# Patient Record
Sex: MALE | Race: BLACK OR AFRICAN AMERICAN | Employment: PART TIME | URBAN - METROPOLITAN AREA
[De-identification: names, ages, dates, MRNs, and addresses within clinical notes are randomized per-mention and may not be internally consistent; named-entity substitution may affect disease eponyms.]

---

## 2017-01-04 ENCOUNTER — OFFICE VISIT (OUTPATIENT)
Dept: FAMILY MEDICINE CLINIC | Age: 67
End: 2017-01-04

## 2017-01-04 VITALS
TEMPERATURE: 97.1 F | BODY MASS INDEX: 21.2 KG/M2 | WEIGHT: 160 LBS | RESPIRATION RATE: 18 BRPM | DIASTOLIC BLOOD PRESSURE: 74 MMHG | HEART RATE: 63 BPM | SYSTOLIC BLOOD PRESSURE: 136 MMHG | OXYGEN SATURATION: 99 % | HEIGHT: 73 IN

## 2017-01-04 DIAGNOSIS — Z12.11 COLON CANCER SCREENING: ICD-10-CM

## 2017-01-04 DIAGNOSIS — G57.11 MERALGIA PARESTHETICA, RIGHT: Primary | ICD-10-CM

## 2017-01-04 NOTE — PROGRESS NOTES
Chief Complaint   Patient presents with    Other     numbness in right hip     Pt reports when lying down or falling asleep he will wake up and have numbness to his right hip and thigh. Pt stated started about a month ago. Pt reporting he doesn't know which BP meds he is taking.  Has both amlodopine and htzc and wasn't aware to take both

## 2017-01-04 NOTE — MR AVS SNAPSHOT
Visit Information Date & Time Provider Department Dept. Phone Encounter #  
 1/4/2017  3:45 PM Rishi Yoo MD [facility] [phone] [id number] Your Appointments 3/13/2017 11:30 AM  
Follow Up with Kirsten Camilo NP [facility] ([address]) Appt Note: follow up [address] [place] [address] [phone] [facility] [id number]  
59 [facility] [place] [address] Upcoming Health Maintenance Date Due COLONOSCOPY 6/15/2016 DTaP/Tdap/Td series (2 - Td) 6/28/2026 Allergies as of 1/4/2017  Review Complete On: 1/4/2017 By: Rishi Yoo MD  
 No Known Allergies Current Immunizations  Never Reviewed No immunizations on file. Not reviewed this visit You Were Diagnosed With   
  
 Codes Comments Meralgia paresthetica, right    -  Primary ICD-10-CM: G57.11 ICD-9-CM: 355.1 Colon cancer screening     ICD-10-CM: Z12.11 ICD-9-CM: V76.51 Vitals BP Pulse Temp Resp Height(growth percentile) Weight(growth percentile) 136/74 63 97.1 °F (36.2 °C) (Oral) 18 6' 1\" (1.854 m) 160 lb (72.6 kg) SpO2 BMI Smoking Status 99% 21.11 kg/m2 Current Every Day Smoker Vitals History BMI and BSA Data Body Mass Index Body Surface Area  
 21.11 kg/m 2 1.93 m 2 Preferred Pharmacy Pharmacy Name Phone [id number] [place], [address] [facility] [phone] Your Updated Medication List  
  
   
This list is accurate as of: 1/4/17  4:44 PM.  Always use your most recent med list.  
  
  
  
  
 aspirin 81 mg chewable tablet Take 1 Tab by mouth daily. atorvastatin 20 mg tablet Commonly known as:  LIPITOR  
TAKE 1 TABLET EVERY DAY  
  
 hydroCHLOROthiazide 12.5 mg capsule Commonly known as:  Alvia Boss TAKE ONE CAPSULE EVERY DAY FOR FOR BLOOD PRESSURE Referral Information Referral ID Referred By Referred To [id number] [facility] Not Available Visits Status Start Date End Date 1 New Request 1/4/17 1/4/18 If your referral has a status of pending review or denied, additional information will be sent to support the outcome of this decision. Patient Instructions Meralgia Paresthetica: Care Instructions Your Care Instructions Meralgia paresthetica (say \"muh-RAL-juh ale-hgd-GKAU-ick-uh\") is pain and numbness in the outer part of your thigh. The pain might get worse after you walk or stand for a long time. This pain and numbness occur when a nerve in your thigh is pinched (compressed). Sometimes the problem is caused by wearing tight clothing or being overweight. Most of the time the problem goes away on its own in a few months. Lowering any pressure on the thigh area may help. Wear loose clothes, and lose weight if you need to. Follow-up care is a key part of your treatment and safety. Be sure to make and go to all appointments, and call your doctor if you are having problems. It's also a good idea to know your test results and keep a list of the medicines you take. How can you care for yourself at home? · Most times the problem gets better on its own. Try wearing loose clothing to see if this helps. · Lose weight if you need to. Talk with your doctor if you need help. When should you call for help? Watch closely for changes in your health, and be sure to contact your doctor if: 
· You have new symptoms, such as pain that gets worse or new numbness in your thigh. · You do not get better as expected. Where can you learn more? Go to http://jessica-kyra.info/. Enter Z509 in the search box to learn more about \"Meralgia Paresthetica: Care Instructions. \" Current as of: February 19, 2016 Content Version: 11.1 © 0722-5732 ParentingInformer, [employer].  Care instructions adapted under license by [employer] (which disclaims liability or warranty for this information). If you have questions about a medical condition or this instruction, always ask your healthcare professional. [address] any warranty or liability for your use of this information. Introducing [facility] SERVICES! [facility] introduces Linekong patient portal. Now you can access parts of your medical record, email your doctor's office, and request medication refills online. 1. In your internet browser, go to [email or web address] 2. Click on the First Time User? Click Here link in the Sign In box. You will see the New Member Sign Up page. 3. Enter your Linekong Access Code exactly as it appears below. You will not need to use this code after youve completed the sign-up process. If you do not sign up before the expiration date, you must request a new code. · Linekong Access Code: [email or web address] Expires: 4/4/2017  4:44 PM 
 
4. Enter the last four digits of your Social Security Number (xxxx) and Date of Birth (mm/dd/yyyy) as indicated and click Submit. You will be taken to the next sign-up page. 5. Create a Linekong ID. This will be your Linekong login ID and cannot be changed, so think of one that is secure and easy to remember. 6. Create a Linekong password. You can change your password at any time. 7. Enter your Password Reset Question and Answer. This can be used at a later time if you forget your password. 8. Enter your e-mail address. You will receive e-mail notification when new information is available in [address] 9. Click Sign Up. You can now view and download portions of your medical record. 10. Click the Download Summary menu link to download a portable copy of your medical information. If you have questions, please visit the Frequently Asked Questions section of the Linekong website. Remember, Linekong is NOT to be used for urgent needs. For medical emergencies, dial 911. Now available from your iPhone and Android! Please provide this summary of care documentation to your next provider. Your primary care clinician is listed as Mae Ellison. If you have any questions after today's visit, please call [phone].

## 2017-01-04 NOTE — PATIENT INSTRUCTIONS
Meralgia Paresthetica: Care Instructions  Your Care Instructions  Meralgia paresthetica (say \"muh-RAL-juh dqc-tfq-ONBF-ick-uh\") is pain and numbness in the outer part of your thigh. The pain might get worse after you walk or stand for a long time. This pain and numbness occur when a nerve in your thigh is pinched (compressed). Sometimes the problem is caused by wearing tight clothing or being overweight. Most of the time the problem goes away on its own in a few months. Lowering any pressure on the thigh area may help. Wear loose clothes, and lose weight if you need to. Follow-up care is a key part of your treatment and safety. Be sure to make and go to all appointments, and call your doctor if you are having problems. It's also a good idea to know your test results and keep a list of the medicines you take. How can you care for yourself at home? · Most times the problem gets better on its own. Try wearing loose clothing to see if this helps. · Lose weight if you need to. Talk with your doctor if you need help. When should you call for help? Watch closely for changes in your health, and be sure to contact your doctor if:  · You have new symptoms, such as pain that gets worse or new numbness in your thigh. · You do not get better as expected. Where can you learn more? Go to http://jessica-kyra.info/. Enter Z441 in the search box to learn more about \"Meralgia Paresthetica: Care Instructions. \"  Current as of: February 19, 2016  Content Version: 11.1  © 0658-5593 Healthwise, Incorporated. Care instructions adapted under license by BigTent Design (which disclaims liability or warranty for this information). If you have questions about a medical condition or this instruction, always ask your healthcare professional. [address] any warranty or liability for your use of this information.

## 2017-01-04 NOTE — PROGRESS NOTES
HISTORY OF PRESENT ILLNESS  Beau Motley is a 61 y.o. male. HPI Has been having intermittent numbness in R lateral thigh. Usually comes on when is lying supine. Symptoms relieved by standing up and moving around. 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 Has had these symptoms for one month. No back pain, hip pain, leg weakness, falls. No headaches. No R arm problems. ROS    Physical Exam   Constitutional: He appears well-developed and well-nourished. HENT:   Right Ear: External ear normal.   Left Ear: External ear normal.   Mouth/Throat: Oropharynx is clear and moist.   Neck: No thyromegaly present. Cardiovascular: Normal rate, regular rhythm, normal heart sounds and intact distal pulses. Pulmonary/Chest: Effort normal and breath sounds normal. No respiratory distress. He has no wheezes. Abdominal: Soft. Bowel sounds are normal. He exhibits no distension and no mass. There is no tenderness. There is no guarding. Musculoskeletal: Normal range of motion. He exhibits no edema. Lymphadenopathy:     He has no cervical adenopathy. Neurological:   Decreased sensation R lateral thigh   Nursing note and vitals reviewed. ASSESSMENT and PLAN  Orders Placed This Encounter    REFERRAL FOR COLONOSCOPY     Josee Hunter was seen today for other.     Diagnoses and all orders for this visit:    Meralgia paresthetica, right    Colon cancer screening  -     REFERRAL FOR COLONOSCOPY      Follow-up Disposition: Not on File

## 2017-04-03 ENCOUNTER — OFFICE VISIT (OUTPATIENT)
Dept: HEMATOLOGY | Age: 67
End: 2017-04-03

## 2017-04-03 VITALS
SYSTOLIC BLOOD PRESSURE: 161 MMHG | DIASTOLIC BLOOD PRESSURE: 80 MMHG | BODY MASS INDEX: 20.82 KG/M2 | HEART RATE: 68 BPM | WEIGHT: 157.8 LBS | OXYGEN SATURATION: 97 % | TEMPERATURE: 97.5 F

## 2017-04-03 DIAGNOSIS — B18.1 CHRONIC HEPATITIS B (HCC): ICD-10-CM

## 2017-04-03 DIAGNOSIS — B18.2 CHRONIC HEPATITIS C WITHOUT HEPATIC COMA (HCC): Primary | ICD-10-CM

## 2017-04-03 NOTE — MR AVS SNAPSHOT
Visit Information Date & Time Provider Department Dept. Phone Encounter #  
 4/3/2017  4:00 PM Kirsten Madera NP [facility] [phone] [id number] Your Appointments 4/3/2017  4:00 PM  
Follow Up with [facility] Appt Note: follow up; r/s  
 [facility] [phone] [facility] [address]  
59 [facility] 1 [address] [place] Upcoming Health Maintenance Date Due COLONOSCOPY 6/15/2016 GLAUCOMA SCREENING Q2Y 3/28/2017 Pneumococcal 65+ Low/Medium Risk (1 of 2 - PCV13) 3/28/2017 MEDICARE YEARLY EXAM 3/28/2017 DTaP/Tdap/Td series (2 - Td) 6/28/2026 Allergies as of 4/3/2017  Review Complete On: 4/3/2017 By: Kirsten Madera NP No Known Allergies Current Immunizations  Never Reviewed No immunizations on file. Not reviewed this visit You Were Diagnosed With   
  
 Codes Comments Chronic hepatitis C without hepatic coma (HCC)    -  Primary ICD-10-CM: B18.2 ICD-9-CM: 070.54 Chronic hepatitis B ([facility]     ICD-10-CM: B18.1 ICD-9-CM: 070.32 Vitals BP Pulse Temp Weight(growth percentile) SpO2 BMI  
 161/80 68 97.5 °F (36.4 °C) (Tympanic) 157 lb 12.8 oz (71.6 kg) 97% 20.82 kg/m2 Smoking Status Current Every Day Smoker Vitals History BMI and BSA Data Body Mass Index Body Surface Area  
 20.82 kg/m 2 1.92 m 2 Preferred Pharmacy Pharmacy Name Phone [id number] [place], [address] [place]omew Covert [phone] Your Updated Medication List  
  
   
This list is accurate as of: 4/3/17  3:44 PM.  Always use your most recent med list.  
  
  
  
  
 aspirin 81 mg chewable tablet Take 1 Tab by mouth daily. atorvastatin 20 mg tablet Commonly known as:  LIPITOR  
TAKE 1 TABLET EVERY DAY  
  
 hydroCHLOROthiazide 12.5 mg capsule Commonly known as:  Larissa Malcolm TAKE ONE CAPSULE EVERY DAY FOR FOR BLOOD PRESSURE We Performed the Following AFP WITH AFP-L3% [[id number] Custom] CBC W/O DIFF [71135 CPT(R)] HCV RNA BY NORA QL,RFLX TO QT [92526 CPT(R)] HEPATITIS B, QT, PCR [69049 CPT(R)] METABOLIC PANEL, COMPREHENSIVE [23017 CPT(R)] To-Do List   
 04/03/2017 Imaging:  US ABD LTD   
  
 10/04/2017 8:30 AM  
  Appointment with Kirsten Mahoney NP at Liver [address] [place] ([phone]) Introducing [facility] & HEALTH SERVICES! [employer] introduces Helixbind patient portal. Now you can access parts of your medical record, email your doctor's office, and request medication refills online. 1. In your internet browser, go to [email or web address] 2. Click on the First Time User? Click Here link in the Sign In box. You will see the New Member Sign Up page. 3. Enter your Helixbind Access Code exactly as it appears below. You will not need to use this code after youve completed the sign-up process. If you do not sign up before the expiration date, you must request a new code. · Helixbind Access Code: [email or web address] Expires: 4/4/2017  5:44 PM 
 
4. Enter the last four digits of your Social Security Number (xxxx) and Date of Birth (mm/dd/yyyy) as indicated and click Submit. You will be taken to the next sign-up page. 5. Create a Helixbind ID. This will be your Helixbind login ID and cannot be changed, so think of one that is secure and easy to remember. 6. Create a Helixbind password. You can change your password at any time. 7. Enter your Password Reset Question and Answer. This can be used at a later time if you forget your password. 8. Enter your e-mail address. You will receive e-mail notification when new information is available in [address] 9. Click Sign Up. You can now view and download portions of your medical record. 10. Click the Download Summary menu link to download a portable copy of your medical information. If you have questions, please visit the Frequently Asked Questions section of the CitizenDish website. Remember, CitizenDish is NOT to be used for urgent needs. For medical emergencies, dial 911. Now available from your iPhone and Android! Please provide this summary of care documentation to your next provider. Your primary care clinician is listed as Gabbie Lantigua. If you have any questions after today's visit, please call [phone].

## 2017-04-04 LAB
AFP L3 MFR SERPL: NORMAL % (ref 0–9.9)
AFP SERPL-MCNC: 0.8 NG/ML (ref 0–8)
ALBUMIN SERPL-MCNC: 4.8 G/DL (ref 3.6–4.8)
ALBUMIN/GLOB SERPL: 2.1 {RATIO} (ref 1.2–2.2)
ALP SERPL-CCNC: 88 IU/L (ref 39–117)
ALT SERPL-CCNC: 34 IU/L (ref 0–44)
AST SERPL-CCNC: 37 IU/L (ref 0–40)
BILIRUB SERPL-MCNC: 0.4 MG/DL (ref 0–1.2)
BUN SERPL-MCNC: 13 MG/DL (ref 8–27)
BUN/CREAT SERPL: 12 (ref 10–24)
CALCIUM SERPL-MCNC: 9.8 MG/DL (ref 8.6–10.2)
CHLORIDE SERPL-SCNC: 100 MMOL/L (ref 96–106)
CO2 SERPL-SCNC: 25 MMOL/L (ref 18–29)
CREAT SERPL-MCNC: 1.13 MG/DL (ref 0.76–1.27)
ERYTHROCYTE [DISTWIDTH] IN BLOOD BY AUTOMATED COUNT: 13.3 % (ref 12.3–15.4)
GLOBULIN SER CALC-MCNC: 2.3 G/DL (ref 1.5–4.5)
GLUCOSE SERPL-MCNC: 107 MG/DL (ref 65–99)
HBV DNA SERPL NAA+PROBE-ACNC: NORMAL IU/ML
HBV DNA SERPL NAA+PROBE-LOG IU: NORMAL LOG10IU/ML
HCT VFR BLD AUTO: 38.3 % (ref 37.5–51)
HGB BLD-MCNC: 13.3 G/DL (ref 12.6–17.7)
MCH RBC QN AUTO: 33.7 PG (ref 26.6–33)
MCHC RBC AUTO-ENTMCNC: 34.7 G/DL (ref 31.5–35.7)
MCV RBC AUTO: 97 FL (ref 79–97)
PLATELET # BLD AUTO: 306 X10E3/UL (ref 150–379)
POTASSIUM SERPL-SCNC: 4.9 MMOL/L (ref 3.5–5.2)
PROT SERPL-MCNC: 7.1 G/DL (ref 6–8.5)
RBC # BLD AUTO: 3.95 X10E6/UL (ref 4.14–5.8)
REF LAB TEST REF RANGE: NORMAL
SODIUM SERPL-SCNC: 144 MMOL/L (ref 134–144)
WBC # BLD AUTO: 6.1 X10E3/UL (ref 3.4–10.8)

## 2017-04-05 LAB — HCV RNA SERPL QL NAA+PROBE: NEGATIVE

## 2017-04-05 NOTE — PROGRESS NOTES
Maria Del Carmen Persaud MD, HILARY López PA-C Rosary Harms, MD, FACP, MD Betsy Conte NP Valera Paling, HILARY        [address] [facility], [address] Nimo Fernandes  22.     [phone]     FAX: [address] [place] [address], [address] [place] [address]     [phone]     FAX: [phone]     Patient Care Team:  Dolly Cason MD as PCP - General  Corey Tobar MD (Gastroenterology)    Patient Active Problem List   Diagnosis Code    LBP (low back pain) M54.5    Chronic hepatitis C ([facility] B18.2    Chronic hepatitis B [facility]) B18.1       Nora Becerra is a 74 y.o. 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 The active problem list, all pertinent past medical history, medications, liver histology, endoscopic studies, radiologic findings and laboratory findings related to the liver disorder were reviewed with the patient. The patient has inactive e-antigen negative HBV with HBV DNA of under 2,000 IU/ml and normal liver enzymes. Treatment is not indicated at this time. The patient is a non-responder to previous treatment with peginterferon and ribavirin. He is a sustained virologic responder to 12 weeks of re-treatment with Harvoni (5/7/2015-8/17/2015). He tolerated this treatment very well with no reported side effects. He returns to the clinic today to monitor his response to treatment. Today, patient feels well with no new physical complaints today. The patient completes all daily activities without any functional limitations. The patient has not experienced fatigue, pain in the right side over the liver, arthralgias, myalgias.     ALLERGIES  No Known Allergies    Current Outpatient Prescriptions   Medication Sig  atorvastatin (LIPITOR) 20 mg tablet TAKE 1 TABLET EVERY DAY    hydrochlorothiazide (MICROZIDE) 12.5 mg capsule TAKE ONE CAPSULE EVERY DAY FOR FOR BLOOD PRESSURE    aspirin 81 mg chewable tablet Take 1 Tab by mouth daily. No current facility-administered medications for this visit. REVIEW OF SYSTEMS:  Constitution systems: Negative for fever, chills, weight gain, weight loss. Eyes: Negative for diplopia, visual changes, eye pain. ENT: Negative for sore throat, painful swallowing. Respiratory: Negative for cough, hemoptysis, dyspnea. Cardiology: Negative for chest pain, palpitations. GI:  Negative for constipation or diarrhea. : Negative for urinary frequency, dysuria and hematuria. Skin: Negative for rash. Hematology: Negative for easy bruising, bleeding from gums or nose. Musculo-skelatal: Negative for back pain, muscle pain, weakness. Neurologic: Negative for headaches, dizziness, vertigo, memory problems. Psychology: Negative for anxiety, depression. PHYSICAL EXAMINATION:  Visit Vitals    /80    Pulse 68    Temp 97.5 °F (36.4 °C) (Tympanic)    Wt 157 lb 12.8 oz (71.6 kg)    SpO2 97%    BMI 20.82 kg/m2     General: No acute distress. Eyes: Sclera anicteric. ENT: No oral lesions. Thyroid normal.  Nodes: No adenopathy. Skin: No spider angiomata. No jaundice. No palmar erythema. Respiratory: Lungs clear to auscultation. Cardiovascular: Regular heart rate. No murmurs. No JVD. Abdomen: Soft non-tender. Liver size normal to percussion/palpation. Spleen not palpable. No obvious ascites. Extremities: No edema. No muscle wasting. No gross arthritic changes. Neurologic: Alert and oriented. Cranial nerves grossly intact. No asterixsis.     LABORATORY STUDIES:  [facility] [address] & Units 4/3/2017 9/19/2016   WBC 3.4 - 10.8 x10E3/uL 6.1 5.6   ANC 1.4 - 7.0 x10E3/uL     HGB 12.6 - 17.7 g/dL 13.3 15.1    - 379 x10E3/uL 306 257   INR 0.8 - 1.2     AST 0 - 40 IU/L 37 30   ALT 0 - 44 IU/L 34 32   Alk Phos 39 - 117 IU/L 88 87   Bili, Total 0.0 - 1.2 mg/dL 0.4 0.3   Bili, Direct 0.00 - 0.40 mg/dL     Albumin 3.6 - 4.8 g/dL 4.8 4.8   BUN 8 - 27 mg/dL 13 12   Creat 0.76 - 1.27 mg/dL 1.13 1.14   Na 134 - 144 mmol/L 144 144   K 3.5 - 5.2 mmol/L 4.9 4.3   Cl 96 - 106 mmol/L 100 100   CO2 18 - 29 mmol/L 25 26   Glucose 65 - 99 mg/dL 107 (H) 99   Virology Latest Ref Rng & Units 4/3/2017 9/19/2016   HBV DNA IU/mL HBV DNA not detected Comment   HCV RNA, NORA, QL Negative  Negative     A CMP, HBV DNA and HCV RNA were ordered today. Will review results when available. SEROLOGIES:  1/2013. HBSurface antigen positive. Anti-HIV negative. Serologies Latest Ref Rng 2/14/2013 1/2/2013   Hep B Surface Ag Negative  Confirm. indicated   Hep B Core Ab, Total Negative Positive (A)    Hep B Surface Ab 0.00 - 0.99 Index Value 0.77    Hep C Genotype See Note 1a    IL28B Genotype  CT genotype    Ferritin 30 - 400 ng/mL 449 (H)    Iron % Saturation 15 - 55 % 47      2/2013. HBEantigen negative, anti-HBE positive, HBV  IU/ml, HCV RNA Log 7.2 IU/ml, antiHDV negative. LIVER HISTOLOGY:  3/2013. Slides reviewed by MLS. Moderate hepatitis with septal fibrosis. Knodell Score is 8 (3,1,3,1). Domitila fibrosis score is 3. ENDOSCOPIC PROCEDURES:  6/2006. Colonoscopy by Dr Blaine Velazquez. Single small polyp in rectum. Diverticulosis. RADIOLOGY:  6/2010. CT scan abdomen with and without IV contrast.  Normal appearing liver. A few scattered small cysts. No liver mass lesions. No dilated bile ducts. No bile duct strictures. No ascites. 3/2013. Ultrasound of liver. Normal appearing liver. No liver mass lesions. OTHER TESTING:  Not available or performed    ASSESSMENT AND PLAN:  Chronic HCV with Domitila stage 3 fibrosis. Liver enzymes and function remain normal. Will continue to monitor patient regularly. HCV treatment.  Cured from 12 weeks of treatment with Sivakumar Cotton in August 2015. Chronic inactive HBV. E-antigen negative. Today's HBV DNA remains 'not detected'. FibroScan. Discussed having a FibroScan with his next follow up visit in 6 months to reassess his liver disease. Discussed the procedure. He is in agreement with this. [address] surveillance. He is due for an abdominal ultrasound to rule out [address] Ordered again today. Discussed the importance of getting this done for his HBV. Patient verbalized understanding of this. The patient was directed to continue all current medications at the current dosages. There are no contraindications for the patient to take any medications that are necessary for treatment of other medical issues. The patient was counseled regarding alcohol consumption. [address] [place] [address] in 6 months with Fibroscan.     Jose Lantigua NP  L[facility] [place] [address]  Ph: [phone]  Fax: [phone]  Email: [email or web address]

## 2017-04-10 ENCOUNTER — HOSPITAL ENCOUNTER (OUTPATIENT)
Dept: ULTRASOUND IMAGING | Age: 67
Discharge: HOME | End: 2017-04-10
Attending: NURSE PRACTITIONER
Payer: COMMERCIAL

## 2017-04-10 DIAGNOSIS — B18.2 CHRONIC HEPATITIS C WITHOUT HEPATIC COMA (HCC): ICD-10-CM

## 2017-04-10 DIAGNOSIS — B18.1 CHRONIC HEPATITIS B (HCC): ICD-10-CM

## 2017-04-10 PROCEDURE — 76705 ECHO EXAM OF ABDOMEN: CPT

## 2017-05-10 RX ORDER — ATORVASTATIN CALCIUM 20 MG/1
20 TABLET, FILM COATED ORAL DAILY
Qty: 30 TAB | Refills: 3 | Status: CANCELLED | OUTPATIENT
Start: 2017-05-10

## 2017-06-08 RX ORDER — HYDROCHLOROTHIAZIDE 12.5 MG/1
CAPSULE ORAL
Qty: 90 CAP | Refills: 1 | OUTPATIENT
Start: 2017-06-08

## 2017-06-08 RX ORDER — HYDROCODONE POLISTIREX AND CHLORPHENIRAMINE POLISTIREX 10; 8 MG/5ML; MG/5ML
SUSPENSION, EXTENDED RELEASE ORAL
Refills: 0 | COMMUNITY
Start: 2017-05-16 | End: 2017-09-12

## 2017-06-09 RX ORDER — HYDROCHLOROTHIAZIDE 12.5 MG/1
CAPSULE ORAL
Qty: 90 CAP | Refills: 3 | Status: SHIPPED | OUTPATIENT
Start: 2017-06-09 | End: 2017-09-12 | Stop reason: SDUPTHER

## 2017-07-21 RX ORDER — ATORVASTATIN CALCIUM 20 MG/1
TABLET, FILM COATED ORAL
Qty: 30 TAB | Refills: 0 | Status: SHIPPED | OUTPATIENT
Start: 2017-07-21 | End: 2017-09-12 | Stop reason: SDUPTHER

## 2017-08-23 ENCOUNTER — TELEPHONE (OUTPATIENT)
Dept: FAMILY MEDICINE CLINIC | Age: 67
End: 2017-08-23

## 2017-08-23 NOTE — TELEPHONE ENCOUNTER
Called pt to ask if he wanted to make appt with Dr. Deepthi Clay. Pt verbalized understanding and said they would call back.

## 2017-09-12 ENCOUNTER — OFFICE VISIT (OUTPATIENT)
Dept: FAMILY MEDICINE CLINIC | Age: 67
End: 2017-09-12

## 2017-09-12 VITALS
SYSTOLIC BLOOD PRESSURE: 128 MMHG | DIASTOLIC BLOOD PRESSURE: 62 MMHG | WEIGHT: 156 LBS | TEMPERATURE: 99.1 F | RESPIRATION RATE: 14 BRPM | BODY MASS INDEX: 20.67 KG/M2 | HEART RATE: 61 BPM | OXYGEN SATURATION: 98 % | HEIGHT: 73 IN

## 2017-09-12 DIAGNOSIS — Z00.00 ROUTINE GENERAL MEDICAL EXAMINATION AT A HEALTH CARE FACILITY: Primary | ICD-10-CM

## 2017-09-12 DIAGNOSIS — E78.00 HYPERCHOLESTEROLEMIA: ICD-10-CM

## 2017-09-12 DIAGNOSIS — I10 ESSENTIAL HYPERTENSION: ICD-10-CM

## 2017-09-12 LAB
BILIRUB UR QL STRIP: NORMAL
GLUCOSE UR-MCNC: NEGATIVE MG/DL
KETONES P FAST UR STRIP-MCNC: NORMAL MG/DL
PH UR STRIP: 5.5 [PH] (ref 4.6–8)
PROT UR QL STRIP: NEGATIVE MG/DL
SP GR UR STRIP: 1.02 (ref 1–1.03)
UA UROBILINOGEN AMB POC: NORMAL (ref 0.2–1)
URINALYSIS CLARITY POC: CLEAR
URINALYSIS COLOR POC: YELLOW
URINE BLOOD POC: NORMAL
URINE LEUKOCYTES POC: NEGATIVE
URINE NITRITES POC: NEGATIVE

## 2017-09-12 RX ORDER — ATORVASTATIN CALCIUM 20 MG/1
TABLET, FILM COATED ORAL
Qty: 90 TAB | Refills: 3 | Status: SHIPPED | OUTPATIENT
Start: 2017-09-12 | End: 2018-02-05 | Stop reason: SDUPTHER

## 2017-09-12 RX ORDER — HYDROCODONE POLISTIREX AND CHLORPHENIRAMINE POLISTIREX 10; 8 MG/5ML; MG/5ML
SUSPENSION, EXTENDED RELEASE ORAL
Refills: 0 | Status: CANCELLED | OUTPATIENT
Start: 2017-09-12

## 2017-09-12 RX ORDER — HYDROCHLOROTHIAZIDE 12.5 MG/1
CAPSULE ORAL
Qty: 90 CAP | Refills: 3 | Status: SHIPPED | OUTPATIENT
Start: 2017-09-12 | End: 2018-11-05 | Stop reason: SDUPTHER
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
## 2017-09-12 NOTE — MR AVS SNAPSHOT
Visit Information Date & Time Provider Department Dept. Phone Encounter #  
 9/12/2017  2:45 PM Jenny Rodriguez MD [facility] [phone] [id number] Your Appointments 9/12/2017  2:45 PM  
ROUTINE CARE with Jenny Rodriguez MD  
[facility]) Appt Note: f/u on meds [address] [facility] [address] [phone] [address] [place] [address] [address] Upcoming Health Maintenance Date Due COLONOSCOPY 6/15/2016 GLAUCOMA SCREENING Q2Y 3/28/2017 Pneumococcal 65+ Low/Medium Risk (1 of 2 - PCV13) 3/28/2017 MEDICARE YEARLY EXAM 3/28/2017 DTaP/Tdap/Td series (2 - Td) 6/28/2026 Allergies as of 9/12/2017  Review Complete On: 9/12/2017 By: Jenny Rodriguez MD  
 No Known Allergies Current Immunizations  Never Reviewed No immunizations on file. Not reviewed this visit You Were Diagnosed With   
  
 Codes Comments Routine general medical examination at a health care facility    -  Primary ICD-10-CM: Z00.00 ICD-9-CM: V70.0 Essential hypertension     ICD-10-CM: I10 
ICD-9-CM: 401.9 Hypercholesterolemia     ICD-10-CM: E78.00 ICD-9-CM: 272.0 Vitals BP Pulse Temp Resp Height(growth percentile) Weight(growth percentile) 128/62 61 99.1 °F (37.3 °C) (Oral) 14 6' 1\" (1.854 m) 156 lb (70.8 kg) SpO2 BMI Smoking Status 98% 20.58 kg/m2 Current Every Day Smoker Vitals History BMI and BSA Data Body Mass Index Body Surface Area 20.58 kg/m 2 1.91 m 2 Preferred Pharmacy Pharmacy Name Phone [id number] [place], [address] [place] [phone] Your Updated Medication List  
  
   
This list is accurate as of: 9/12/17  2:43 PM.  Always use your most recent med list.  
  
  
  
  
 aspirin 81 mg chewable tablet Take 1 Tab by mouth daily. atorvastatin 20 mg tablet Commonly known as:  LIPITOR  
TAKE 1 TABLET EVERY DAY for cholesterol  
  
 hydroCHLOROthiazide 12.5 mg capsule Commonly known as:  Brenda Donate TAKE ONE CAPSULE EVERY DAY FOR FOR BLOOD PRESSURE Prescriptions Sent to Pharmacy Refills  
 hydroCHLOROthiazide (MICROZIDE) 12.5 mg capsule 3 Sig: TAKE ONE CAPSULE EVERY DAY FOR FOR BLOOD PRESSURE Class: Normal  
 Pharmacy: [address] Ph #: [phone]  
 atorvastatin (LIPITOR) 20 mg tablet 3 Sig: TAKE 1 TABLET EVERY DAY for cholesterol Class: Normal  
 Pharmacy: [address] Ph #: [phone] We Performed the Following AMB POC URINALYSIS DIP STICK AUTO W/ MICRO [96617 CPT(R)] CBC WITH AUTOMATED DIFF [26844 CPT(R)] LIPID PANEL [72215 CPT(R)] METABOLIC PANEL, COMPREHENSIVE [16058 CPT(R)] PSA, DIAGNOSTIC (PROSTATE SPECIFIC AG) [id number] CPT(R)] To-Do List   
 10/04/2017 8:30 AM  
  Appointment with Kirsten Swann NP at [place] [address] ([phone]) [facility]cing [address] [facility] introduces AlloCure patient portal. Now you can access parts of your medical record, email your doctor's office, and request medication refills online. 1. In your internet browser, go to [email or web address] 2. Click on the First Time User? Click Here link in the Sign In box. You will see the New Member Sign Up page. 3. Enter your AlloCure Access Code exactly as it appears below. You will not need to use this code after youve completed the sign-up process. If you do not sign up before the expiration date, you must request a new code. · AlloCure Access Code: [email or web address] Expires: 12/11/2017  2:42 PM 
 
4. Enter the last four digits of your Social Security Number (xxxx) and Date of Birth (mm/dd/yyyy) as indicated and click Submit. You will be taken to the next sign-up page. 5. Create a Adelja Learning ID. This will be your Adelja Learning login ID and cannot be changed, so think of one that is secure and easy to remember. 6. Create a Adelja Learning password. You can change your password at any time. 7. Enter your Password Reset Question and Answer. This can be used at a later time if you forget your password. 8. Enter your e-mail address. You will receive e-mail notification when new information is available in [address] 9. Click Sign Up. You can now view and download portions of your medical record. 10. Click the Download Summary menu link to download a portable copy of your medical information. If you have questions, please visit the Frequently Asked Questions section of the Adelja Learning website. Remember, Adelja Learning is NOT to be used for urgent needs. For medical emergencies, dial 911. Now available from your iPhone and Android! Please provide this summary of care documentation to your next provider. Your primary care clinician is listed as Rory Nayak. If you have any questions after today's visit, please call [phone].
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
## 2017-09-13 LAB
ALBUMIN SERPL-MCNC: 4.5 G/DL (ref 3.6–4.8)
ALBUMIN/GLOB SERPL: 2 {RATIO} (ref 1.2–2.2)
ALP SERPL-CCNC: 81 IU/L (ref 39–117)
ALT SERPL-CCNC: 20 IU/L (ref 0–44)
AST SERPL-CCNC: 23 IU/L (ref 0–40)
BASOPHILS # BLD AUTO: 0 X10E3/UL (ref 0–0.2)
BASOPHILS NFR BLD AUTO: 0 %
BILIRUB SERPL-MCNC: 0.4 MG/DL (ref 0–1.2)
BUN SERPL-MCNC: 9 MG/DL (ref 8–27)
BUN/CREAT SERPL: 8 (ref 10–24)
CALCIUM SERPL-MCNC: 9.8 MG/DL (ref 8.6–10.2)
CHLORIDE SERPL-SCNC: 102 MMOL/L (ref 96–106)
CHOLEST SERPL-MCNC: 203 MG/DL (ref 100–199)
CO2 SERPL-SCNC: 25 MMOL/L (ref 18–29)
CREAT SERPL-MCNC: 1.13 MG/DL (ref 0.76–1.27)
EOSINOPHIL # BLD AUTO: 0.1 X10E3/UL (ref 0–0.4)
EOSINOPHIL NFR BLD AUTO: 2 %
ERYTHROCYTE [DISTWIDTH] IN BLOOD BY AUTOMATED COUNT: 13.8 % (ref 12.3–15.4)
GLOBULIN SER CALC-MCNC: 2.3 G/DL (ref 1.5–4.5)
GLUCOSE SERPL-MCNC: 104 MG/DL (ref 65–99)
HCT VFR BLD AUTO: 36.6 % (ref 37.5–51)
HDLC SERPL-MCNC: 73 MG/DL
HGB BLD-MCNC: 12.8 G/DL (ref 12.6–17.7)
IMM GRANULOCYTES # BLD: 0 X10E3/UL (ref 0–0.1)
IMM GRANULOCYTES NFR BLD: 0 %
INTERPRETATION, 910389: NORMAL
LDLC SERPL CALC-MCNC: 112 MG/DL (ref 0–99)
LYMPHOCYTES # BLD AUTO: 2.1 X10E3/UL (ref 0.7–3.1)
LYMPHOCYTES NFR BLD AUTO: 35 %
MCH RBC QN AUTO: 34 PG (ref 26.6–33)
MCHC RBC AUTO-ENTMCNC: 35 G/DL (ref 31.5–35.7)
MCV RBC AUTO: 97 FL (ref 79–97)
MONOCYTES # BLD AUTO: 0.3 X10E3/UL (ref 0.1–0.9)
MONOCYTES NFR BLD AUTO: 5 %
NEUTROPHILS # BLD AUTO: 3.4 X10E3/UL (ref 1.4–7)
NEUTROPHILS NFR BLD AUTO: 58 %
PLATELET # BLD AUTO: 221 X10E3/UL (ref 150–379)
POTASSIUM SERPL-SCNC: 4.1 MMOL/L (ref 3.5–5.2)
PROT SERPL-MCNC: 6.8 G/DL (ref 6–8.5)
PSA SERPL-MCNC: 1.1 NG/ML (ref 0–4)
RBC # BLD AUTO: 3.77 X10E6/UL (ref 4.14–5.8)
SODIUM SERPL-SCNC: 144 MMOL/L (ref 134–144)
TRIGL SERPL-MCNC: 88 MG/DL (ref 0–149)
VLDLC SERPL CALC-MCNC: 18 MG/DL (ref 5–40)
WBC # BLD AUTO: 5.9 X10E3/UL (ref 3.4–10.8)

## 2017-10-19 ENCOUNTER — OFFICE VISIT (OUTPATIENT)
Dept: HEMATOLOGY | Age: 67
End: 2017-10-19

## 2017-10-19 VITALS
SYSTOLIC BLOOD PRESSURE: 195 MMHG | TEMPERATURE: 98.6 F | WEIGHT: 155 LBS | BODY MASS INDEX: 20.45 KG/M2 | HEART RATE: 58 BPM | OXYGEN SATURATION: 100 % | DIASTOLIC BLOOD PRESSURE: 100 MMHG

## 2017-10-19 DIAGNOSIS — B18.1 CHRONIC HEPATITIS B (HCC): ICD-10-CM

## 2017-10-19 DIAGNOSIS — B18.2 CHRONIC HEPATITIS C WITHOUT HEPATIC COMA (HCC): Primary | ICD-10-CM

## 2017-10-19 NOTE — MR AVS SNAPSHOT
Visit Information Date & Time Provider Department Dept. Phone Encounter #  
 10/19/2017 11:45 AM April LISBETH Cheek Julienne, [address] [facility] 219 [id number] Upcoming Health Maintenance Date Due  
 GLAUCOMA SCREENING Q2Y 3/28/2017 Pneumococcal 65+ Low/Medium Risk (1 of 2 - PCV13) 3/28/2017 MEDICARE YEARLY EXAM 3/28/2017 DTaP/Tdap/Td series (2 - Td) 6/28/2026 COLONOSCOPY 2/28/2027 Allergies as of 10/19/2017  Review Complete On: 10/19/2017 By: April Justin Howe NP No Known Allergies Current Immunizations  Never Reviewed No immunizations on file. Not reviewed this visit You Were Diagnosed With   
  
 Codes Comments Chronic hepatitis C without hepatic coma (HCC)    -  Primary ICD-10-CM: B18.2 ICD-9-CM: 070.54 Chronic hepatitis B ([facility] [address]     ICD-10-CM: B18.1 ICD-9-CM: 070.32 Vitals BP Pulse Temp Weight(growth percentile) SpO2 BMI  
 (!) 195/100 (!) 58 98.6 °F (37 °C) (Oral) 155 lb (70.3 kg) 100% 20.45 kg/m2 Smoking Status Current Every Day Smoker Vitals History BMI and BSA Data Body Mass Index Body Surface Area  
 20.45 kg/m 2 1.9 m 2 Preferred Pharmacy Pharmacy Name Phone [id number] [place], [address] Elayne Lira [phone] Your Updated Medication List  
  
   
This list is accurate as of: 10/19/17 12:18 PM.  Always use your most recent med list.  
  
  
  
  
 aspirin 81 mg chewable tablet Take 1 Tab by mouth daily. atorvastatin 20 mg tablet Commonly known as:  LIPITOR  
TAKE 1 TABLET EVERY DAY for cholesterol  
  
 hydroCHLOROthiazide 12.5 mg capsule Commonly known as:  Hedwig Doing TAKE ONE CAPSULE EVERY DAY FOR FOR BLOOD PRESSURE Patient Instructions FIBROSCAN PATIENT INFORMATION What is Fibroscan:? 
 
Fibroscan is an ultrasound device that measures liver stiffness by sending a pulse of vibrations through the liver. This translated into an immediate result that can help your healthcare team determine the level of damage to the liver as well as monitor the condition of various liver diseases over time. Fibroscan is helpful in the evaluation of the following conditions: 
 
Chronic Hepatitis C Chronic Hepatitis B Fatty Liver Disease Alcohol Liver Disease Chronic Cholestatic Liver Diseases What happens During the Scan? Patients receiving this exam lie flat on an examination table and raise the right arm above the head. The skin over the right lower rib cage is exposed and the examiner locates the correct area to be scanned. The prove of the scanner is placed directly on the patient and triggered to start. This fells like a gentle flick against the skin and should not be uncomfortable. At least ten (10) readings are taken and the average is calculated to score the amount of liver stiffness or scar tissue. The exam should take 10-20 minutes. What do I need to do to prepare for the scan? Please do not eat or drink anything 2-4 hours  Before your Fibroscan. You should continue taking any prescribed medication and can take small sips of water or clear fluid to do so,  But avoid drinking large amounts of fluid. Please dress comfortably in clothes that will allow for easy access to the right side of the abdomen. Women are discouraged from wearing a dress on the day of the exam. 
 
Are there any special precautions? Patients who are pregnant or have an implantable device (for example, pacemaker or defibrillator) should not have this exam performed. Patients with a significant amount of fat tissue in the area the probe is pressed may be unable to have test performed. Introducing [facility] & HEALTH SERVICES! [facility] introduces Switchable Solutions patient portal. Now you can access parts of your medical record, email your doctor's office, and request medication refills online. 1. In your internet browser, go to [email or web address] 2. Click on the First Time User? Click Here link in the Sign In box. You will see the New Member Sign Up page. 3. Enter your kalidea Access Code exactly as it appears below. You will not need to use this code after youve completed the sign-up process. If you do not sign up before the expiration date, you must request a new code. · kalidea Access Code: [email or web address] Expires: 12/11/2017  2:42 PM 
 
4. Enter the last four digits of your Social Security Number (xxxx) and Date of Birth (mm/dd/yyyy) as indicated and click Submit. You will be taken to the next sign-up page. 5. Create a Quut ID. This will be your kalidea login ID and cannot be changed, so think of one that is secure and easy to remember. 6. Create a kalidea password. You can change your password at any time. 7. Enter your Password Reset Question and Answer. This can be used at a later time if you forget your password. 8. Enter your e-mail address. You will receive e-mail notification when new information is available in [address] 9. Click Sign Up. You can now view and download portions of your medical record. 10. Click the Download Summary menu link to download a portable copy of your medical information. If you have questions, please visit the Frequently Asked Questions section of the kalidea website. Remember, kalidea is NOT to be used for urgent needs. For medical emergencies, dial 911. Now available from your iPhone and Android! Please provide this summary of care documentation to your next provider. Your primary care clinician is listed as Lan Gould. If you have any questions after today's visit, please call [phone].

## 2017-10-20 NOTE — PROGRESS NOTES
[address] MD, Irma Messer, [place], [place]       HILARY Michele PA-C Domenick Sexton, [facility]   HILARY Godfrey NP        at [address], [address]     [phone]     FAX: [phone]    at [address], [id number] [place] [address], [address]     [phone]     FAX: [phone]     Patient Care Team:  Amanda Quevedo MD as PCP - General  Ivone Mercado MD (Gastroenterology)    Patient Active Problem List   Diagnosis Code    LBP (low back pain) M54.5    Chronic hepatitis C ([address] B18.2    Chronic hepatitis B [facility]) B18.1       Kushal Sanz is a 74 y.o. male who returns to the [facility] for monitoring of chronic HCV and HBV co-infection with bridging fibrosis. The active problem list, all pertinent past medical history, medications, liver histology, endoscopic studies, radiologic findings and laboratory findings related to the liver disorder were reviewed with the patient. The patient has inactive e-antigen negative HBV with HBV DNA of under 2,000 IU/ml and normal liver enzymes. Treatment is not indicated at this time. Last imaging in April 2017 ruled out [address] and ascites. The report mentioned changes concerning for cirrhosis. FibroScan has not been performed yet to reassess fibrosis after successful HCV treatment. The patient is a non-responder to previous treatment with peginterferon and ribavirin. He is cured with 12 weeks of re-treatment of Harvoni (5/7/2015-8/17/2015). Today, patient feels well with no new physical complaints today. The patient completes all daily activities without any functional limitations. The patient has not experienced fatigue, pain in the right side over the liver, arthralgias, myalgias.     ALLERGIES  No Known Allergies    Current Outpatient Prescriptions   Medication Sig    hydroCHLOROthiazide (MICROZIDE) 12.5 mg capsule TAKE ONE CAPSULE EVERY DAY FOR FOR BLOOD PRESSURE    atorvastatin (LIPITOR) 20 mg tablet TAKE 1 TABLET EVERY DAY for cholesterol    aspirin 81 mg chewable tablet Take 1 Tab by mouth daily. No current facility-administered medications for this visit. REVIEW OF SYSTEMS:  Constitution systems: Negative for fever, chills, weight gain, weight loss. Eyes: Negative for diplopia, visual changes, eye pain. ENT: Negative for sore throat, painful swallowing. Respiratory: Negative for cough, hemoptysis, dyspnea. Cardiology: Negative for chest pain, palpitations. GI:  Negative for constipation or diarrhea. : Negative for urinary frequency, dysuria and hematuria. Skin: Negative for rash. Hematology: Negative for easy bruising, bleeding from gums or nose. Musculo-skelatal: Negative for back pain, muscle pain, weakness. Neurologic: Negative for headaches, dizziness, vertigo, memory problems. Psychology: Negative for anxiety, depression. PHYSICAL EXAMINATION:  Visit Vitals    BP (!) 195/100    Pulse (!) 58    Temp 98.6 °F (37 °C) (Oral)    Wt 155 lb (70.3 kg)    SpO2 100%    BMI 20.45 kg/m2     General: No acute distress. Eyes: Sclera anicteric. ENT: No oral lesions. Thyroid normal.  Nodes: No adenopathy. Skin: No spider angiomata. No jaundice. No palmar erythema. Respiratory: Lungs clear to auscultation. Cardiovascular: Regular heart rate. No murmurs. No JVD. Abdomen: Soft non-tender. Liver size normal to percussion/palpation. Spleen not palpable. No obvious ascites. Extremities: No edema. No muscle wasting. No gross arthritic changes. Neurologic: Alert and oriented. Cranial nerves grossly intact. No asterixsis.     LABORATORY STUDIES:  [facility] [place] [facility] [address] [place] [address] & Units 9/12/2017 4/3/2017   WBC 3.4 - 10.8 x10E3/uL 5.9 6.1   ANC 1.4 - 7.0 x10E3/uL 3.4    HGB 12.6 - 17.7 g/dL 12.8 13.3    - 379 x10E3/uL 221 306   INR 0.8 - 1.2     AST 0 - 40 IU/L 23 37   ALT 0 - 44 IU/L 20 34   Alk Phos 39 - 117 IU/L 81 88   Bili, Total 0.0 - 1.2 mg/dL 0.4 0.4   Bili, Direct 0.00 - 0.40 mg/dL     Albumin 3.6 - 4.8 g/dL 4.5 4.8   BUN 8 - 27 mg/dL 9 13   Creat 0.76 - 1.27 mg/dL 1.13 1.13   Na 134 - 144 mmol/L 144 144   K 3.5 - 5.2 mmol/L 4.1 4.9   Cl 96 - 106 mmol/L 102 100   CO2 18 - 29 mmol/L 25 25   Glucose 65 - 99 mg/dL 104 (H) 107 (H)   Virology Latest Ref Rng & Units  4/3/2017   HBV DNA IU/mL  HBV DNA not detected   HCV RNA, NORA, QL Negative  Negative     SEROLOGIES:  1/2013. HBSurface antigen positive. Anti-HIV negative. Serologies Latest Ref Rng 2/14/2013 1/2/2013   Hep B Surface Ag Negative  Confirm. indicated   Hep B Core Ab, Total Negative Positive (A)    Hep B Surface Ab 0.00 - 0.99 Index Value 0.77    Hep C Genotype See Note 1a    IL28B Genotype  CT genotype    Ferritin 30 - 400 ng/mL 449 (H)    Iron % Saturation 15 - 55 % 47      2/2013. HBEantigen negative, anti-HBE positive, HBV  IU/ml, HCV RNA Log 7.2 IU/ml, antiHDV negative. LIVER HISTOLOGY:  3/2013. Slides reviewed by MLS. Moderate hepatitis with septal fibrosis. Knodell Score is 8 (3,1,3,1). Domitila fibrosis score is 3. ENDOSCOPIC PROCEDURES:  6/2006. Colonoscopy by Dr Mela Gonzalez. Single small polyp in rectum. Diverticulosis. RADIOLOGY:  6/2010. CT scan abdomen with and without IV contrast.  Normal appearing liver. A few scattered small cysts. No liver mass lesions. No dilated bile ducts. No bile duct strictures. No ascites. 3/2013. Ultrasound of liver. Normal appearing liver. No liver mass lesions. 4/2017. Ultrasound of liver. Echogenic consistent with cirrhosis. No liver mass lesions. No dilated bile ducts. No ascites. OTHER TESTING:  Not available or performed    ASSESSMENT AND PLAN:  Chronic HCV with Domitila stage 3 fibrosis.  Liver enzymes and function remain normal. Will continue to monitor patient regularly. HCV treatment. Cured from 12 weeks of treatment with Harvoni in August 2015. Chronic inactive HBV. E-antigen negative. HBV DNA remains not detected/suppressed. FibroScan. Discussed having a FibroScan to reassess his liver disease. Discussed the procedure. He is in agreement with this. This will be scheduled. If this demonstrates/suggests cirrhosis, HBV treatment will be initiated even though it is inactive to reduce risk of HCC and activation/liver decompensation. [facility] [address] surveillance. Currently up to date with an unremarkable US in April 2017. Next imaging is due this month. This was ordered today. The patient was directed to continue all current medications at the current dosages. There are no contraindications for the patient to take any medications that are necessary for treatment of other medical issues. The patient was counseled regarding alcohol consumption. Follow-up [facility] [place] [place] for [address]     Chris Livingston NP  [facility] [place] [address]  Ph: [phone]  Fax: [phone]  Email: [email or web address]

## 2017-11-15 ENCOUNTER — OFFICE VISIT (OUTPATIENT)
Dept: HEMATOLOGY | Age: 67
End: 2017-11-15

## 2017-11-15 VITALS
OXYGEN SATURATION: 99 % | TEMPERATURE: 96.9 F | DIASTOLIC BLOOD PRESSURE: 99 MMHG | WEIGHT: 158.6 LBS | HEART RATE: 58 BPM | BODY MASS INDEX: 20.92 KG/M2 | SYSTOLIC BLOOD PRESSURE: 213 MMHG

## 2017-11-15 DIAGNOSIS — I10 ESSENTIAL HYPERTENSION: ICD-10-CM

## 2017-11-15 DIAGNOSIS — B18.2 CHRONIC HEPATITIS C WITHOUT HEPATIC COMA (HCC): Primary | ICD-10-CM

## 2017-11-15 DIAGNOSIS — B18.1 CHRONIC HEPATITIS B (HCC): ICD-10-CM

## 2017-11-15 NOTE — MR AVS SNAPSHOT
Visit Information Date & Time Provider Department Dept. Phone Encounter #  
 11/15/2017 10:45 AM April LISBETH Arnold, [address] [facility] 219 [id number] Upcoming Health Maintenance Date Due  
 GLAUCOMA SCREENING Q2Y 3/28/2017 Pneumococcal 65+ Low/Medium Risk (1 of 2 - PCV13) 3/28/2017 MEDICARE YEARLY EXAM 3/28/2017 DTaP/Tdap/Td series (2 - Td) 6/28/2026 COLONOSCOPY 2/28/2027 Allergies as of 11/15/2017  Review Complete On: 11/15/2017 By: Kirsten Camilo NP No Known Allergies Current Immunizations  Never Reviewed No immunizations on file. Not reviewed this visit Vitals BP Pulse Temp Weight(growth percentile) SpO2 BMI  
 (!) 213/99 (BP 1 Location: Right arm, BP Patient Position: Sitting) (!) 58 96.9 °F (36.1 °C) (Tympanic) 158 lb 9.6 oz (71.9 kg) 99% 20.92 kg/m2 Smoking Status Current Every Day Smoker Vitals History BMI and BSA Data Body Mass Index Body Surface Area  
 20.92 kg/m 2 1.92 m 2 Preferred Pharmacy Pharmacy Name Phone [id number] [address], [address] [facility] [phone] Your Updated Medication List  
  
   
This list is accurate as of: 11/15/17 11:12 AM.  Always use your most recent med list.  
  
  
  
  
 aspirin 81 mg chewable tablet Take 1 Tab by mouth daily. atorvastatin 20 mg tablet Commonly known as:  LIPITOR  
TAKE 1 TABLET EVERY DAY for cholesterol  
  
 hydroCHLOROthiazide 12.5 mg capsule Commonly known as:  Alvia Boss TAKE ONE CAPSULE EVERY DAY FOR FOR BLOOD PRESSURE Introducing [facility] & HEALTH SERVICES! [facility] introduces The Flipping Pro's patient portal. Now you can access parts of your medical record, email your doctor's office, and request medication refills online. 1. In your internet browser, go to [email or web address] 2. Click on the First Time User? Click Here link in the Sign In box.  You will see the New Member Sign Up page. 3. Enter your Vollee Access Code exactly as it appears below. You will not need to use this code after youve completed the sign-up process. If you do not sign up before the expiration date, you must request a new code. · Vollee Access Code: [email or web address] Expires: 12/11/2017  1:42 PM 
 
4. Enter the last four digits of your Social Security Number (xxxx) and Date of Birth (mm/dd/yyyy) as indicated and click Submit. You will be taken to the next sign-up page. 5. Create a Vollee ID. This will be your Vollee login ID and cannot be changed, so think of one that is secure and easy to remember. 6. Create a Vollee password. You can change your password at any time. 7. Enter your Password Reset Question and Answer. This can be used at a later time if you forget your password. 8. Enter your e-mail address. You will receive e-mail notification when new information is available in [address] 9. Click Sign Up. You can now view and download portions of your medical record. 10. Click the Download Summary menu link to download a portable copy of your medical information. If you have questions, please visit the Frequently Asked Questions section of the Vollee website. Remember, Vollee is NOT to be used for urgent needs. For medical emergencies, dial 911. Now available from your iPhone and Android! Please provide this summary of care documentation to your next provider. Your primary care clinician is listed as Matteo Rodriguez. If you have any questions after today's visit, please call [phone].

## 2017-11-15 NOTE — PROGRESS NOTES
[address] An[address] Collins MD, [place], [place] Ludivina Nino, [facility], NP    ANICETO Taylor, [facility]   Elizabeth Schaefer, HILARY Nur NP        at [address], [address] [address]     [phone]     FAX: [phone]    at [address], [address] [place] [address], [address]     [phone]     FAX: [phone]     Patient Care Team:  Roxann Peña MD as PCP - General  Rohan Ly MD (Gastroenterology)    Patient Active Problem List   Diagnosis Code    LBP (low back pain) M54.5    Chronic hepatitis C ([facility] [address] B18.2    Chronic hepatitis B [facility]) B18.1       Galilea Avendaño is a 74 y.o. male who returns to the [facility] for monitoring of chronic HCV and HBV co-infection with bridging fibrosis on liver biopsy in 2013. The active problem list, all pertinent past medical history, medications, liver histology, endoscopic studies, radiologic findings and laboratory findings related to the liver disorder were reviewed with the patient. The patient has inactive e-antigen negative HBV with HBV DNA of under 2,000 IU/ml and normal liver enzymes. Treatment is not indicated at this time. Last imaging in April 2017 ruled out [address] and ascites. The report mentioned changes concerning for cirrhosis. Patient presents to the clinic today for a Fibroscan to assess liver fibrosis or scarring. The patient is a non-responder to previous treatment with peginterferon and ribavirin. He was cured with 12 weeks of re-treatment of Harvoni (5/7/2015-8/17/2015). Today, patient feels well with no new physical complaints today. The patient completes all daily activities without any functional limitations. The patient has not experienced fatigue, pain in the right side over the liver, arthralgias, myalgias.     ALLERGIES  No Known Allergies    Current Outpatient Prescriptions   Medication Sig    hydroCHLOROthiazide (MICROZIDE) 12.5 mg capsule TAKE ONE CAPSULE EVERY DAY FOR FOR BLOOD PRESSURE    atorvastatin (LIPITOR) 20 mg tablet TAKE 1 TABLET EVERY DAY for cholesterol    aspirin 81 mg chewable tablet Take 1 Tab by mouth daily. No current facility-administered medications for this visit. REVIEW OF SYSTEMS:  Constitution systems: Negative for fever, chills, weight gain, weight loss. Eyes: Negative for diplopia, visual changes, eye pain. ENT: Negative for sore throat, painful swallowing. Respiratory: Negative for cough, hemoptysis, dyspnea. Cardiology: Negative for chest pain, palpitations. GI:  Negative for constipation or diarrhea. : Negative for urinary frequency, dysuria and hematuria. Skin: Negative for rash. Hematology: Negative for easy bruising, bleeding from gums or nose. Musculo-skelatal: Negative for back pain, muscle pain, weakness. Neurologic: Negative for headaches, dizziness, vertigo, memory problems. Psychology: Negative for anxiety, depression. PHYSICAL EXAMINATION:  Visit Vitals    BP (!) 213/99 (BP 1 Location: Right arm, BP Patient Position: Sitting)    Pulse (!) 58    Temp 96.9 °F (36.1 °C) (Tympanic)    Wt 158 lb 9.6 oz (71.9 kg)    SpO2 99%    BMI 20.92 kg/m2     General: No acute distress. Eyes: Sclera anicteric. ENT: No oral lesions. Thyroid normal.  Nodes: No adenopathy. Skin: No spider angiomata. No jaundice. No palmar erythema. Respiratory: Lungs clear to auscultation. Cardiovascular: Regular heart rate. No murmurs. No JVD. Abdomen: Soft non-tender. Liver size normal to percussion/palpation. Spleen not palpable. No obvious ascites. Extremities: No edema. No muscle wasting. No gross arthritic changes. Neurologic: Alert and oriented. Cranial nerves grossly intact. No asterixsis.     LABORATORY STUDIES:  [facility] [place] [facility] [address] [place] [address] & Units 9/12/2017 4/3/2017   WBC 3.4 - 10.8 x10E3/uL 5.9 6.1   ANC 1.4 - 7.0 x10E3/uL 3.4    HGB 12.6 - 17.7 g/dL 12.8 13.3    - 379 x10E3/uL 221 306   INR 0.8 - 1.2     AST 0 - 40 IU/L 23 37   ALT 0 - 44 IU/L 20 34   Alk Phos 39 - 117 IU/L 81 88   Bili, Total 0.0 - 1.2 mg/dL 0.4 0.4   Bili, Direct 0.00 - 0.40 mg/dL     Albumin 3.6 - 4.8 g/dL 4.5 4.8   BUN 8 - 27 mg/dL 9 13   Creat 0.76 - 1.27 mg/dL 1.13 1.13   Na 134 - 144 mmol/L 144 144   K 3.5 - 5.2 mmol/L 4.1 4.9   Cl 96 - 106 mmol/L 102 100   CO2 18 - 29 mmol/L 25 25   Glucose 65 - 99 mg/dL 104 (H) 107 (H)   Virology Latest Ref Rng & Units  4/3/2017   HBV DNA IU/mL  HBV DNA not detected   HCV RNA, NORA, QL Negative  Negative     SEROLOGIES:  1/2013. HBSurface antigen positive. Anti-HIV negative. Serologies Latest Ref Rng 2/14/2013 1/2/2013   Hep B Surface Ag Negative  Confirm. indicated   Hep B Core Ab, Total Negative Positive (A)    Hep B Surface Ab 0.00 - 0.99 Index Value 0.77    Hep C Genotype See Note 1a    IL28B Genotype  CT genotype    Ferritin 30 - 400 ng/mL 449 (H)    Iron % Saturation 15 - 55 % 47      2/2013. HBEantigen negative, anti-HBE positive, HBV  IU/ml, HCV RNA Log 7.2 IU/ml, antiHDV negative. LIVER HISTOLOGY:  3/2013. Slides reviewed by MLS. Moderate hepatitis with septal fibrosis. Knodell Score is 8 (3,1,3,1). Domitila fibrosis score is 3.   11/2017. Fib[facility]Scan [facility] at [facility]. EkPa was 7.9. Suggested fibrosis level is F2.    ENDOSCOPIC PROCEDURES:  6/2006. Colonoscopy by Dr José Miguel Phelan. Single small polyp in rectum. Diverticulosis. RADIOLOGY:  6/2010. CT scan abdomen with and without IV contrast.  Normal appearing liver. A few scattered small cysts. No liver mass lesions. No dilated bile ducts. No bile duct strictures. No ascites. 3/2013. Ultrasound of liver. Normal appearing liver. No liver mass lesions. 4/2017. Ultrasound of liver. Echogenic consistent with cirrhosis. No liver mass lesions.   No dilated bile ducts. No ascites. OTHER TESTING:  Not available or performed    ASSESSMENT AND PLAN:  Chronic HCV with moderate fibrosis. This was confirmed with FibroScan today. Results were discussed in detail with patient. His fibrosis is improving over time. Liver enzymes and function remain normal. Will continue to monitor patient regularly. HCV treatment. Cured from 12 weeks of treatment with Harvoni in August 2015. Chronic inactive HBV. E-antigen negative. HBV DNA remains not detected/suppressed. He will need long-term follow up to monitor this. FibroScan. He will continue to have this annually to reassess his liver fibrosis. [facility]. Currently up to date with an unremarkable US in April 2017. Next imaging will be due in April 2018. Hypertension. Patient's BP is significantly elevated in the clinic today. Discussed the importance of regular follow up with their PCP to monitor/manage this. Patient verbalized understanding. The patient was directed to continue all current medications at the current dosages. There are no contraindications for the patient to take any medications that are necessary for treatment of other medical issues. The patient was counseled regarding alcohol consumption. Follow-up Ayaan Sims 32 in 1 year for FibroScan.     Taryn Jung NP  [facility]iver [place] [address], [address] [place] [address]  Ph: [phone]  Fax: [phone]  Email: [email or web address]

## 2018-02-05 ENCOUNTER — OFFICE VISIT (OUTPATIENT)
Dept: FAMILY MEDICINE CLINIC | Age: 68
End: 2018-02-05

## 2018-02-05 VITALS
BODY MASS INDEX: 21.15 KG/M2 | WEIGHT: 159.6 LBS | HEIGHT: 73 IN | OXYGEN SATURATION: 99 % | HEART RATE: 71 BPM | TEMPERATURE: 99.4 F | SYSTOLIC BLOOD PRESSURE: 166 MMHG | DIASTOLIC BLOOD PRESSURE: 80 MMHG | RESPIRATION RATE: 14 BRPM

## 2018-02-05 DIAGNOSIS — Z00.00 ROUTINE GENERAL MEDICAL EXAMINATION AT A HEALTH CARE FACILITY: Primary | ICD-10-CM

## 2018-02-05 DIAGNOSIS — R07.89 OTHER CHEST PAIN: ICD-10-CM

## 2018-02-05 DIAGNOSIS — I10 ESSENTIAL HYPERTENSION: ICD-10-CM

## 2018-02-05 DIAGNOSIS — R09.89 FEMORAL BRUIT: ICD-10-CM

## 2018-02-05 RX ORDER — AMLODIPINE BESYLATE 5 MG/1
5 TABLET ORAL DAILY
Qty: 30 TAB | Refills: 12 | Status: SHIPPED | OUTPATIENT
Start: 2018-02-05 | End: 2018-03-06 | Stop reason: SDUPTHER

## 2018-02-05 RX ORDER — ATORVASTATIN CALCIUM 40 MG/1
TABLET, FILM COATED ORAL
Qty: 90 TAB | Refills: 3 | Status: SHIPPED | OUTPATIENT
Start: 2018-02-05 | End: 2019-05-06 | Stop reason: SDUPTHER
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
## 2018-02-05 NOTE — ACP (ADVANCE CARE PLANNING)
Wouldn't want to be kept alive on ventilator or feeding tubes if was terminally ill. Would want wife Peyton Perez, to make his medical decisions for him, and after her, daughter, Taty Maciel.
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
## 2018-02-05 NOTE — MR AVS SNAPSHOT
[facility] 
 
 
 [address] [facility] [id number] [phone] Patient: Nancie Reinoso MRN: [id number] OVP:[id number] Visit Information Date & Time Provider Department Dept. Phone Encounter #  
 2/5/2018 10:15 AM Yanna Hunter MD [facility] [phone] [id number] Follow-up Instructions Return in about 3 months (around 5/5/2018). Upcoming Health Maintenance Date Due  
 GLAUCOMA SCREENING Q2Y 3/28/2017 Pneumococcal 65+ Low/Medium Risk (1 of 2 - PCV13) 3/28/2017 MEDICARE YEARLY EXAM 3/28/2017 DTaP/Tdap/Td series (2 - Td) 6/28/2026 COLONOSCOPY 2/28/2027 Allergies as of 2/5/2018  Review Complete On: 2/5/2018 By: Yanna Hunter MD  
 No Known Allergies Current Immunizations  Never Reviewed No immunizations on file. Not reviewed this visit You Were Diagnosed With   
  
 Codes Comments Routine general medical examination at a health care facility    -  Primary ICD-10-CM: Z00.00 ICD-9-CM: V70.0 Other chest pain     ICD-10-CM: R07.89 ICD-9-CM: 786.59 Essential hypertension     ICD-10-CM: I10 
ICD-9-CM: 401.9 Femoral bruit     ICD-10-CM: R09.89 ICD-9-CM: 060. 9 Vitals BP Pulse Temp Resp Height(growth percentile) Weight(growth percentile) 166/80 71 99.4 °F (37.4 °C) (Oral) 14 6' 1\" (1.854 m) 159 lb 9.6 oz (72.4 kg) SpO2 BMI Smoking Status 99% 21.06 kg/m2 Current Every Day Smoker Vitals History BMI and BSA Data Body Mass Index Body Surface Area 21.06 kg/m 2 1.93 m 2 Preferred Pharmacy Pharmacy Name Phone [id number] [place], [address] Lisa Kapoor [phone] Your Updated Medication List  
  
   
This list is accurate as of: 2/5/18 10:35 AM.  Always use your most recent med list. amLODIPine 5 mg tablet Commonly known as:  Ardeen Squire Take 1 Tab by mouth daily. For blood pressure aspirin 81 mg chewable tablet Take 1 Tab by mouth daily. atorvastatin 40 mg tablet Commonly known as:  LIPITOR  
TAKE 1 TABLET EVERY DAY for cholesterol  
  
 hydroCHLOROthiazide 12.5 mg capsule Commonly known as:  Tory Barn TAKE ONE CAPSULE EVERY DAY FOR FOR BLOOD PRESSURE Prescriptions Sent to Pharmacy Refills  
 amLODIPine (NORVASC) 5 mg tablet 12 Sig: Take 1 Tab by mouth daily. For blood pressure Class: Normal  
 Pharmacy: [address] Ph #: [phone] Route: Oral  
 atorvastatin (LIPITOR) 40 mg tablet 3 Sig: TAKE 1 TABLET EVERY DAY for cholesterol Class: Normal  
 Pharmacy: [address] Ph #: [phone] We Performed the Following AMB POC EKG ROUTINE W/ 12 LEADS, INTER & REP [78474 CPT(R)] CBC WITH AUTOMATED DIFF [48443 CPT(R)] LIPID PANEL [81917 CPT(R)] METABOLIC PANEL, COMPREHENSIVE [76545 CPT(R)] REFERRAL TO CARDIOLOGY [MWO37 Custom] Follow-up Instructions Return in about 3 months (around 5/5/2018). To-Do List   
 02/05/2018 ECHO:  ECHO EXERCISE STRESS   
  
 11/14/2018 10:00 AM  
  Appointment with Kirsten Swann NP at [place] [address] ([phone]) Referral Information Referral ID Referred By Referred To  
  
 [id number] REILLY [address] [place] [address] MD Lio   
   [address] [place] [address] Phone: [phone] Fax: [phone] Visits Status Start Date End Date 1 New Request 2/5/18 2/5/19 If your referral has a status of pending review or denied, additional information will be sent to support the outcome of this decision. Introducing [facility]! Meagan Terrazas introduces [employer] patient portal. Now you can access parts of your medical record, email your doctor's office, and request medication refills online.    
 
1. In your internet browser, go to [email or web address] 2. Click on the First Time User? Click Here link in the Sign In box. You will see the New Member Sign Up page. 3. Enter your Sarta Access Code exactly as it appears below. You will not need to use this code after youve completed the sign-up process. If you do not sign up before the expiration date, you must request a new code. · Sarta Access Code: Rosa Morales Expires: 5/6/2018 10:35 AM 
 
4. Enter the last four digits of your Social Security Number (xxxx) and Date of Birth (mm/dd/yyyy) as indicated and click Submit. You will be taken to the next sign-up page. 5. Create a Simply Measuredt ID. This will be your Sarta login ID and cannot be changed, so think of one that is secure and easy to remember. 6. Create a Sarta password. You can change your password at any time. 7. Enter your Password Reset Question and Answer. This can be used at a later time if you forget your password. 8. Enter your e-mail address. You will receive e-mail notification when new information is available in [address] 9. Click Sign Up. You can now view and download portions of your medical record. 10. Click the Download Summary menu link to download a portable copy of your medical information. If you have questions, please visit the Frequently Asked Questions section of the Sarta website. Remember, Sarta is NOT to be used for urgent needs. For medical emergencies, dial 911. Now available from your iPhone and Android! Please provide this summary of care documentation to your next provider. Your primary care clinician is listed as Taina Franco. If you have any questions after today's visit, please call [phone].

## 2018-02-06 LAB
ALBUMIN SERPL-MCNC: 4.9 G/DL (ref 3.6–4.8)
ALBUMIN/GLOB SERPL: 2.1 {RATIO} (ref 1.2–2.2)
ALP SERPL-CCNC: 78 IU/L (ref 39–117)
ALT SERPL-CCNC: 29 IU/L (ref 0–44)
AST SERPL-CCNC: 34 IU/L (ref 0–40)
BASOPHILS # BLD AUTO: 0 X10E3/UL (ref 0–0.2)
BASOPHILS NFR BLD AUTO: 1 %
BILIRUB SERPL-MCNC: 0.3 MG/DL (ref 0–1.2)
BUN SERPL-MCNC: 12 MG/DL (ref 8–27)
BUN/CREAT SERPL: 11 (ref 10–24)
CALCIUM SERPL-MCNC: 9.8 MG/DL (ref 8.6–10.2)
CHLORIDE SERPL-SCNC: 100 MMOL/L (ref 96–106)
CHOLEST SERPL-MCNC: 165 MG/DL (ref 100–199)
CO2 SERPL-SCNC: 26 MMOL/L (ref 18–29)
CREAT SERPL-MCNC: 1.1 MG/DL (ref 0.76–1.27)
EOSINOPHIL # BLD AUTO: 0.1 X10E3/UL (ref 0–0.4)
EOSINOPHIL NFR BLD AUTO: 2 %
ERYTHROCYTE [DISTWIDTH] IN BLOOD BY AUTOMATED COUNT: 13.4 % (ref 12.3–15.4)
GFR SERPLBLD CREATININE-BSD FMLA CKD-EPI: 70 ML/MIN/1.73
GFR SERPLBLD CREATININE-BSD FMLA CKD-EPI: 81 ML/MIN/1.73
GLOBULIN SER CALC-MCNC: 2.3 G/DL (ref 1.5–4.5)
GLUCOSE SERPL-MCNC: 112 MG/DL (ref 65–99)
HCT VFR BLD AUTO: 40 % (ref 37.5–51)
HDLC SERPL-MCNC: 78 MG/DL
HGB BLD-MCNC: 13.4 G/DL (ref 13–17.7)
IMM GRANULOCYTES # BLD: 0 X10E3/UL (ref 0–0.1)
IMM GRANULOCYTES NFR BLD: 0 %
INTERPRETATION, 910389: NORMAL
LDLC SERPL CALC-MCNC: 78 MG/DL (ref 0–99)
LYMPHOCYTES # BLD AUTO: 1.7 X10E3/UL (ref 0.7–3.1)
LYMPHOCYTES NFR BLD AUTO: 31 %
MCH RBC QN AUTO: 33.6 PG (ref 26.6–33)
MCHC RBC AUTO-ENTMCNC: 33.5 G/DL (ref 31.5–35.7)
MCV RBC AUTO: 100 FL (ref 79–97)
MONOCYTES # BLD AUTO: 0.5 X10E3/UL (ref 0.1–0.9)
MONOCYTES NFR BLD AUTO: 9 %
NEUTROPHILS # BLD AUTO: 3.1 X10E3/UL (ref 1.4–7)
NEUTROPHILS NFR BLD AUTO: 57 %
PLATELET # BLD AUTO: 243 X10E3/UL (ref 150–379)
POTASSIUM SERPL-SCNC: 4.2 MMOL/L (ref 3.5–5.2)
PROT SERPL-MCNC: 7.2 G/DL (ref 6–8.5)
RBC # BLD AUTO: 3.99 X10E6/UL (ref 4.14–5.8)
SODIUM SERPL-SCNC: 142 MMOL/L (ref 134–144)
TRIGL SERPL-MCNC: 45 MG/DL (ref 0–149)
VLDLC SERPL CALC-MCNC: 9 MG/DL (ref 5–40)
WBC # BLD AUTO: 5.4 X10E3/UL (ref 3.4–10.8)

## 2018-03-06 ENCOUNTER — OFFICE VISIT (OUTPATIENT)
Dept: CARDIOLOGY CLINIC | Age: 68
End: 2018-03-06

## 2018-03-06 VITALS
SYSTOLIC BLOOD PRESSURE: 160 MMHG | HEIGHT: 73 IN | HEART RATE: 60 BPM | WEIGHT: 165.3 LBS | DIASTOLIC BLOOD PRESSURE: 70 MMHG | RESPIRATION RATE: 12 BRPM | OXYGEN SATURATION: 99 % | BODY MASS INDEX: 21.91 KG/M2

## 2018-03-06 DIAGNOSIS — I10 ESSENTIAL HYPERTENSION: ICD-10-CM

## 2018-03-06 DIAGNOSIS — R07.89 OTHER CHEST PAIN: ICD-10-CM

## 2018-03-06 DIAGNOSIS — E78.00 HIGH CHOLESTEROL: ICD-10-CM

## 2018-03-06 DIAGNOSIS — R94.31 T WAVE INVERSION IN EKG: ICD-10-CM

## 2018-03-06 DIAGNOSIS — R07.9 CHEST PAIN WITH MODERATE RISK FOR CARDIAC ETIOLOGY: Primary | ICD-10-CM

## 2018-03-06 RX ORDER — AMLODIPINE BESYLATE 10 MG/1
10 TABLET ORAL DAILY
Qty: 30 TAB | Refills: 6 | Status: SHIPPED | OUTPATIENT
Start: 2018-03-06 | End: 2019-05-06 | Stop reason: SDUPTHER

## 2018-03-06 NOTE — PROGRESS NOTES
1. Have you been to the ER, urgent care clinic since your last visit? Hospitalized since your last visit? No    2. Have you seen or consulted any other health care providers outside of the [facility]"" since your last visit? Include any pap smears or colon screening. No     Patient C/O chest pain at times.

## 2018-03-06 NOTE — MR AVS SNAPSHOT
[address] [place] 
[phone] Patient: Dayana Luna MRN:  [id number] Visit Information Date & Time Provider Department Dept. Phone Encounter #  
 3/6/2018  9:30 AM Colleen Newsome MD [facility] [phone] [id number] Upcoming Health Maintenance Date Due  
 GLAUCOMA SCREENING Q2Y 3/28/2017 Pneumococcal 65+ Low/Medium Risk (1 of 2 - PCV13) 3/28/2017 MEDICARE YEARLY EXAM 2/6/2019 DTaP/Tdap/Td series (2 - Td) 6/28/2026 COLONOSCOPY 2/28/2027 Allergies as of 3/6/2018  Review Complete On: 3/6/2018 By: Yvrose Luna NP No Known Allergies Current Immunizations  Never Reviewed No immunizations on file. Not reviewed this visit You Were Diagnosed With   
  
 Codes Comments Chest pain with moderate risk for cardiac etiology    -  Primary ICD-10-CM: R07.9 ICD-9-CM: 786.50 Other chest pain     ICD-10-CM: R07.89 ICD-9-CM: 786.59 Essential hypertension     ICD-10-CM: I10 
ICD-9-CM: 401.9 High cholesterol     ICD-10-CM: E78.00 ICD-9-CM: 272.0 T wave inversion in EKG     ICD-10-CM: R94.31 
ICD-9-CM: 794.31 Vitals BP Pulse Resp Height(growth percentile) Weight(growth percentile) SpO2  
 160/70 (BP 1 Location: Right arm, BP Patient Position: Sitting) 60 12 6' 1\" (1.854 m) 165 lb 4.8 oz (75 kg) 99% BMI Smoking Status 21.81 kg/m2 Current Every Day Smoker Vitals History BMI and BSA Data Body Mass Index Body Surface Area  
 21.81 kg/m 2 1.97 m 2 Preferred Pharmacy Pharmacy Name Phone [facility] [address], [address] Betsy Crooks [phone] Your Updated Medication List  
  
   
This list is accurate as of 3/6/18 10:09 AM.  Always use your most recent med list. amLODIPine 10 mg tablet Commonly known as:  Ata Gillette Take 1 Tab by mouth daily. Increased 3/6/18  
  
 aspirin 81 mg chewable tablet Take 1 Tab by mouth daily. atorvastatin 40 mg tablet Commonly known as:  LIPITOR  
TAKE 1 TABLET EVERY DAY for cholesterol  
  
 hydroCHLOROthiazide 12.5 mg capsule Commonly known as:  Catherne Misty TAKE ONE CAPSULE EVERY DAY FOR FOR BLOOD PRESSURE Prescriptions Sent to Pharmacy Refills  
 amLODIPine (NORVASC) 10 mg tablet 6 Sig: Take 1 Tab by mouth daily. Increased 3/6/18 Class: Normal  
 Pharmacy: [facility] [address] [place] [address] #: [phone] Route: Oral  
  
We Performed the Following AMB POC EKG ROUTINE W/ 12 LEADS, INTER & REP [30041 CPT(R)] To-Do List   
 03/07/2018 ECHO:  2D ECHO COMPLETE ADULT (TTE) W OR WO CONTR   
  
 03/07/2018 ECHO:  ECHO TTE STRESS EXRCSE COMP W OR WO CONTR   
  
 11/14/2018 10:00 AM  
  Appointment with Kirsten Swann NP at [place] [address] ([phone]) Introducing [address]! Jhonny Saha introduces [email or web address] patient portal. Now you can access parts of your medical record, email your doctor's office, and request medication refills online. 1. In your internet browser, go to [email or web address] 2. Click on the First Time User? Click Here link in the Sign In box. You will see the New Member Sign Up page. 3. Enter your Fielding Systemst Access Code exactly as it appears below. You will not need to use this code after youve completed the sign-up process. If you do not sign up before the expiration date, you must request a new code. · [email or web address] Access Code: Reggie Porch Expires: 5/6/2018 10:35 AM 
 
4. Enter the last four digits of your Social Security Number (xxxx) and Date of Birth (mm/dd/yyyy) as indicated and click Submit. You will be taken to the next sign-up page. 5. Create a [email or web address].me ID.  This will be your [email or web address] login ID and cannot be changed, so think of one that is secure and easy to remember. 6. Create a gantto password. You can change your password at any time. 7. Enter your Password Reset Question and Answer. This can be used at a later time if you forget your password. 8. Enter your e-mail address. You will receive e-mail notification when new information is available in [address] 9. Click Sign Up. You can now view and download portions of your medical record. 10. Click the Download Summary menu link to download a portable copy of your medical information. If you have questions, please visit the Frequently Asked Questions section of the gantto website. Remember, gantto is NOT to be used for urgent needs. For medical emergencies, dial 911. Now available from your iPhone and Android! Please provide this summary of care documentation to your next provider. Your primary care clinician is listed as Asha Ventura. If you have any questions after today's visit, please call [phone].

## 2018-03-06 NOTE — PROGRESS NOTES
Claudia Rausch DNP, ANP-BC  Subjective/HPI:     Ama Garza is a 74 y.o. male is here for new patient consultation regarding episodes of anterior chest discomfort. Patient reports with or without exertional activities he has had a heartburn type discomfort pointing to midsternal chest without radiation. He denies dyspnea on exertion or excessive fatigue. He states he works out regularly on a \"total home gym\" at least 3 times a week for approximately 1 hour. He denies any family history of known coronary artery disease, he does smoke cigars, he does have hypertension and hyperlipidemia. PCP Provider  Ceasar Canavan, MD  Past Medical History:   Diagnosis Date    Hepatitis B     Hepatitis C     LBP (low back pain) 9/8/2010    Nodule     left adrenal gland      Past Surgical History:   Procedure Laterality Date    ENDOSCOPY, COLON, DIAGNOSTIC  6/2006    Dr Eliseo Zapien polyp    HX APPENDECTOMY      HX COLONOSCOPY  2016    one polyp- repeat 5 years    HX COLONOSCOPY  02/2017    dr. Abdullahi Au, one polyp- repeat in 5 years     No Known Allergies   History reviewed. No pertinent family history. Current Outpatient Prescriptions   Medication Sig    amLODIPine (NORVASC) 5 mg tablet Take 1 Tab by mouth daily. For blood pressure    atorvastatin (LIPITOR) 40 mg tablet TAKE 1 TABLET EVERY DAY for cholesterol    hydroCHLOROthiazide (MICROZIDE) 12.5 mg capsule TAKE ONE CAPSULE EVERY DAY FOR FOR BLOOD PRESSURE    aspirin 81 mg chewable tablet Take 1 Tab by mouth daily. No current facility-administered medications for this visit. Vitals:    03/06/18 0923 03/06/18 0924   BP: 170/80 160/70   Pulse: 60    Resp: 12    SpO2: 99%    Weight: 165 lb 4.8 oz (75 kg)    Height: 6' 1\" (1.854 m)      Social History     Social History    Marital status:      Spouse name: N/A    Number of children: N/A    Years of education: N/A     Occupational History    Not on file.      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 I have reviewed the nurses notes, vitals, problem list, allergy list, medical history, family, social history and medications. Review of Symptoms:    General: Pt denies excessive weight gain or loss. Pt is able to conduct ADL's  HEENT: Denies blurred vision, headaches, epistaxis and difficulty swallowing. Respiratory: Denies shortness of breath, OWEN, wheezing or stridor. Cardiovascular: Denies  palpitations, edema or PND  Gastrointestinal: Denies poor appetite, indigestion, abdominal pain or blood in stool  Musculoskeletal: Denies pain or swelling from muscles or joints  Neurologic: Denies tremor, paresthesias, or sensory motor disturbance  Skin: Denies rash, itching or texture change. Physical Exam:      General: Well developed, in no acute distress, cooperative and alert  HEENT: No carotid bruits, no JVD, trach is midline. Neck Supple, PEERL, EOM intact. Heart:  Normal S1/S2 negative S3 or S4. Regular, no murmur, gallop or rub.   Respiratory: Clear bilaterally x 4, no wheezing or rales  Abdomen:   Soft, non-tender, no masses, bowel sounds are active.   Extremities:  No edema, normal cap refill, no cyanosis, atraumatic. Neuro: A&Ox3, speech clear, gait stable. Skin: Skin color is normal. No rashes or lesions.  Non diaphoretic  Vascular: 2+ pulses symmetric in all extremities    Cardiographics    ECG:  Sinus w/ T wave inversions   Results for orders placed or performed in visit on 06/09/10   AMB POC EKG ROUTINE W/ 12 LEADS, INTER & REP    Impression    Sinus bradycardia         Cardiology Labs:  Lab Results   Component Value Date/Time    Cholesterol, total 165 02/05/2018 10:52 AM    HDL Cholesterol 78 02/05/2018 10:52 AM    LDL, calculated 78 02/05/2018 10:52 AM    Triglyceride 45 02/05/2018 10:52 AM    CHOL/HDL Ratio 2.0 06/09/2010 11:55 AM       Lab Results   Component Value Date/Time    Sodium 142 02/05/2018 10:52 AM    Potassium 4.2 02/05/2018 10:52 AM    Chloride 100 02/05/2018 10:52 AM    CO2 26 02/05/2018 10:52 AM    Anion gap 4 (L) 06/09/2010 11:55 AM    Glucose 112 (H) 02/05/2018 10:52 AM    BUN 12 02/05/2018 10:52 AM    Creatinine 1.10 02/05/2018 10:52 AM    BUN/Creatinine ratio 11 02/05/2018 10:52 AM    GFR est AA 81 02/05/2018 10:52 AM    GFR est non-AA 70 02/05/2018 10:52 AM    Calcium 9.8 02/05/2018 10:52 AM    Bilirubin, total 0.3 02/05/2018 10:52 AM    AST (SGOT) 34 02/05/2018 10:52 AM    Alk. phosphatase 78 02/05/2018 10:52 AM    Protein, total 7.2 02/05/2018 10:52 AM    Albumin 4.9 (H) 02/05/2018 10:52 AM    Globulin 3.0 06/09/2010 11:55 AM    A-G Ratio 2.1 02/05/2018 10:52 AM    ALT (SGPT) 29 02/05/2018 10:52 AM           Assessment:     Assessment:     Diagnoses and all orders for this visit:    1. Chest pain with moderate risk for cardiac etiology    2. Other chest pain  -     AMB POC EKG ROUTINE W/ 12 LEADS, INTER & REP    3. Essential hypertension    4. High cholesterol    5. T wave inversion in EKG        ICD-10-CM ICD-9-CM    1. Chest pain with moderate risk for cardiac etiology R07.9 786.50    2. Other chest pain R07.89 786.59 AMB POC EKG ROUTINE W/ 12 LEADS, INTER & REP   3. Essential hypertension I10 401.9    4. High cholesterol E78.00 272.0    5. T wave inversion in EKG R94.31 794.31      Orders Placed This Encounter    AMB POC EKG ROUTINE W/ 12 LEADS, INTER & REP     Order Specific Question:   Reason for Exam:     Answer:   routine        Plan:     Patient is a 53-year-old male reporting episodes of anterior chest discomfort with or without exertional activities. EKG shows diffuse T-wave inversions. Cardiac risk factors include male, hypertension, hyperlipidemia and smoker. Will evaluate with  stress echo, and 2D echocardiogram.  Hypertension not controlled increased amlodipine to 10 mg, discussed smoking cessation with patient. Follow-up when testing complete. Yariel Nice, HILARY    This note was created using voice recognition software. Despite editing, there may be syntax errors. [facility]    3/7/2018         Patient seen, examined by me personally. Plan discussed as detailed. Agree with note as outlined by  NP. I confirm findings in history and physical exam. No additional findings noted. Agree with plan as outlined above.      Major Arias MD

## 2018-03-12 ENCOUNTER — CLINICAL SUPPORT (OUTPATIENT)
Dept: CARDIOLOGY CLINIC | Age: 68
End: 2018-03-12

## 2018-03-12 DIAGNOSIS — R07.9 CHEST PAIN WITH MODERATE RISK FOR CARDIAC ETIOLOGY: ICD-10-CM

## 2018-03-12 DIAGNOSIS — R94.31 T WAVE INVERSION IN EKG: ICD-10-CM

## 2018-03-12 DIAGNOSIS — I10 ESSENTIAL HYPERTENSION: ICD-10-CM

## 2018-03-12 DIAGNOSIS — E78.00 HIGH CHOLESTEROL: ICD-10-CM

## 2018-03-12 DIAGNOSIS — R07.89 OTHER CHEST PAIN: ICD-10-CM

## 2018-03-12 NOTE — PROGRESS NOTES
Identified patient using full name and . Patient education for testing complete. Patient verbalized understanding.

## 2018-11-05 DIAGNOSIS — Z00.00 ROUTINE GENERAL MEDICAL EXAMINATION AT A HEALTH CARE FACILITY: ICD-10-CM

## 2018-11-05 RX ORDER — HYDROCHLOROTHIAZIDE 12.5 MG/1
CAPSULE ORAL
Qty: 90 CAP | Refills: 3 | Status: SHIPPED | OUTPATIENT
Start: 2018-11-05 | End: 2019-05-06 | Stop reason: SDUPTHER

## 2018-11-05 NOTE — TELEPHONE ENCOUNTER
Last Visit: 2/5  Next Appt: none  Previous Refill Encounter: 9/12/17-90+3    Requested Prescriptions     Pending Prescriptions Disp Refills    hydroCHLOROthiazide (MICROZIDE) 12.5 mg capsule 90 Cap 3     Sig: TAKE ONE CAPSULE EVERY DAY FOR FOR BLOOD PRESSURE

## 2018-11-14 ENCOUNTER — OFFICE VISIT (OUTPATIENT)
Dept: HEMATOLOGY | Age: 68
End: 2018-11-14

## 2018-11-14 VITALS
TEMPERATURE: 96.8 F | HEART RATE: 53 BPM | SYSTOLIC BLOOD PRESSURE: 174 MMHG | WEIGHT: 158.6 LBS | BODY MASS INDEX: 20.92 KG/M2 | DIASTOLIC BLOOD PRESSURE: 94 MMHG | OXYGEN SATURATION: 100 %

## 2018-11-14 DIAGNOSIS — B18.1 CHRONIC HEPATITIS B (HCC): ICD-10-CM

## 2018-11-14 DIAGNOSIS — B18.2 CHRONIC HEPATITIS C WITHOUT HEPATIC COMA (HCC): Primary | ICD-10-CM

## 2018-11-14 LAB
ERYTHROCYTE [DISTWIDTH] IN BLOOD BY AUTOMATED COUNT: 13.4 % (ref 12.3–15.4)
HCT VFR BLD AUTO: 42.8 % (ref 37.5–51)
HGB BLD-MCNC: 14.7 G/DL (ref 13–17.7)
MCH RBC QN AUTO: 34 PG (ref 26.6–33)
MCHC RBC AUTO-ENTMCNC: 34.3 G/DL (ref 31.5–35.7)
MCV RBC AUTO: 99 FL (ref 79–97)
PLATELET # BLD AUTO: 248 X10E3/UL (ref 150–379)
RBC # BLD AUTO: 4.32 X10E6/UL (ref 4.14–5.8)
WBC # BLD AUTO: 5.1 X10E3/UL (ref 3.4–10.8)

## 2018-11-14 NOTE — PROGRESS NOTES
[address] [place], [address], [place], [place]       HILARY Marie PA-C Valere Guild, ROSSY-BC   HILARY Hill NP        at [facility]     [address], [address] [address]     [phone]     FAX: [phone]    at [address], [address]     [phone]     FAX: [phone]     Patient Care Team:  Adithya Tobin MD as PCP - General  Judd aDo MD (Gastroenterology)  Prabhakar Marquez MD as Physician (Cardiology)    Patient Active Problem List   Diagnosis Code    LBP (low back pain) M54.5    Chronic hepatitis C ([address] B18.2    Chronic hepatitis B ([address] B18.1    Other chest pain R07.89    Essential hypertension I10    High cholesterol E78.00       Nara Noble is a 79 y.o. male who returns to the [address] for monitoring of chronic HCV and HBV co-infection with bridging fibrosis on liver biopsy in 2013. The active problem list, all pertinent past medical history, medications, liver histology, endoscopic studies, radiologic findings and laboratory findings related to the liver disorder were reviewed with the patient. The patient has inactive e-antigen negative HBV with HBV DNA of under 2,000 IU/ml and normal liver enzymes. Treatment is not indicated at this time. Last imaging in April 2017 ruled out [address] and ascites. The report mentioned changes concerning for cirrhosis. Patient presents to the clinic today for a Fibroscan to assess liver fibrosis or scarring. The patient is a non-responder to previous treatment with peginterferon and ribavirin. He was cured with 12 weeks of re-treatment of Harvoni (5/7/2015-8/17/2015). Today, patient feels well with no new physical complaints today.  The patient completes all daily activities without any functional limitations. The patient has not experienced fatigue, pain in the right side over the liver, arthralgias, myalgias. ALLERGIES  No Known Allergies    Current Outpatient Medications   Medication Sig    hydroCHLOROthiazide (MICROZIDE) 12.5 mg capsule TAKE ONE CAPSULE EVERY DAY FOR FOR BLOOD PRESSURE    amLODIPine (NORVASC) 10 mg tablet Take 1 Tab by mouth daily. Increased 3/6/18    atorvastatin (LIPITOR) 40 mg tablet TAKE 1 TABLET EVERY DAY for cholesterol    aspirin 81 mg chewable tablet Take 1 Tab by mouth daily. No current facility-administered medications for this visit. REVIEW OF SYSTEMS:  Constitution systems: Negative for fever, chills, weight gain, weight loss. Eyes: Negative for diplopia, visual changes, eye pain. ENT: Negative for sore throat, painful swallowing. Respiratory: Negative for cough, hemoptysis, dyspnea. Cardiology: Negative for chest pain, palpitations. GI:  Negative for constipation or diarrhea. : Negative for urinary frequency, dysuria and hematuria. Skin: Negative for rash. Hematology: Negative for easy bruising, bleeding from gums or nose. Musculo-skelatal: Negative for back pain, muscle pain, weakness. Neurologic: Negative for headaches, dizziness, vertigo, memory problems. Psychology: Negative for anxiety, depression. PHYSICAL EXAMINATION:  Visit Vitals  BP (!) 174/94 (BP 1 Location: Left arm, BP Patient Position: Sitting)   Pulse (!) 53   Temp 96.8 °F (36 °C) (Tympanic)   Wt 158 lb 9.6 oz (71.9 kg)   SpO2 100%   BMI 20.92 kg/m²     General: No acute distress. Eyes: Sclera anicteric. ENT: No oral lesions. Thyroid normal.  Nodes: No adenopathy. Skin: No spider angiomata. No jaundice. No palmar erythema. Respiratory: Lungs clear to auscultation. Cardiovascular: Regular heart rate. No murmurs. No JVD. Abdomen: Soft non-tender. Liver size normal to percussion/palpation. Spleen not palpable. No obvious ascites. Extremities: No edema.  No muscle wasting. No gross arthritic changes. Neurologic: Alert and oriented. Cranial nerves grossly intact. No asterixsis. LABORATORY STUDIES:  [facility] [place] [address] & Units 11/14/2018 2/5/2018   WBC 3.4 - 10.8 x10E3/uL 5.1 5.4   ANC 1.4 - 7.0 x10E3/uL  3.1   HGB 13.0 - 17.7 g/dL 14.7 13.4    - 379 x10E3/uL 248 243   INR 0.8 - 1.2     AST 0 - 40 IU/L 35 34   ALT 0 - 44 IU/L 26 29   Alk Phos 39 - 117 IU/L 92 78   Bili, Total 0.0 - 1.2 mg/dL 0.4 0.3   Bili, Direct 0.00 - 0.40 mg/dL     Albumin 3.6 - 4.8 g/dL 4.9 (H) 4.9 (H)   BUN 8 - 27 mg/dL 9 12   Creat 0.76 - 1.27 mg/dL 1.19 1.10   Na 134 - 144 mmol/L 140 142   K 3.5 - 5.2 mmol/L 4.4 4.2   Cl 96 - 106 mmol/L 100 100   CO2 20 - 29 mmol/L 26 26   Glucose 65 - 99 mg/dL 85 112 (H)     A CMP, CBC, AFP and HBV DNA were ordered today. Will review results when available. SEROLOGIES:  1/2013. HBSurface antigen positive. Anti-HIV negative. Serologies Latest Ref Rng 2/14/2013 1/2/2013   Hep B Surface Ag Negative  Confirm. indicated   Hep B Core Ab, Total Negative Positive (A)    Hep B Surface Ab 0.00 - 0.99 Index Value 0.77    Hep C Genotype See Note 1a    IL28B Genotype  CT genotype    Ferritin 30 - 400 ng/mL 449 (H)    Iron % Saturation 15 - 55 % 47      2/2013. HBEantigen negative, anti-HBE positive, HBV  IU/ml, HCV RNA Log 7.2 IU/ml, antiHDV negative. LIVER HISTOLOGY:  3/2013. Slides reviewed by MLS. Moderate hepatitis with septal fibrosis. Knodell Score is 8 (3,1,3,1). Domitila fibrosis score is 3.   11/2017. FibroScan performed at [facility]. EkPa was 7.9. Suggested fibrosis level is F2.  11/2018. [facility] EkPa was 5.9. Suggested fibrosis level is F0. ENDOSCOPIC PROCEDURES:  6/2006. Colonoscopy by Dr Eladio Hopper. Single small polyp in rectum. Diverticulosis. RADIOLOGY:  6/2010. CT scan abdomen with and without IV contrast.  Normal appearing liver.   A few scattered small cysts. No liver mass lesions. No dilated bile ducts. No bile duct strictures. No ascites. 3/2013. Ultrasound of liver. Normal appearing liver. No liver mass lesions. 4/2017. Ultrasound of liver. Echogenic consistent with cirrhosis. No liver mass lesions. No dilated bile ducts. No ascites. OTHER TESTING:  Not available or performed    ASSESSMENT AND PLAN:  Chronic HCV with no fibrosis. This was confirmed with FibroScan today. Results were discussed in detail with patient. His fibrosis is improving over time. Liver enzymes and function remain normal. Will continue to monitor patient regularly. HCV treatment. Cured from 12 weeks of treatment with Harvoni in August 2015. Chronic inactive HBV. E-antigen negative. HBV DNA remains not detected/suppressed. He will need long-term follow up to monitor this. FibroScan. He will continue to have this annually to reassess his liver fibrosis. [facility] [address] surveillance. Next imaging is due. This was ordered today. Hypertension. Patient's BP is significantly elevated in the clinic today. Discussed the importance of regular follow up with their PCP to monitor/manage this. Patient verbalized understanding. The patient was directed to continue all current medications at the current dosages. There are no contraindications for the patient to take any medications that are necessary for treatment of other medical issues. The patient was counseled regarding alcohol consumption. [facility] in 6 months.     West Singh NP  Liver [place] [address]  Ph: [phone]  Fax: [phone]  Email: [email or web address]

## 2018-11-14 NOTE — LETTER
11/15/2018 2:03 PM 
 
Mr. Kris Diaz [address] [facility] [address] Dear Kris Diaz: 
 
Please find your most recent results below. Resulted Orders METABOLIC PANEL, COMPREHENSIVE Result Value Ref Range Glucose 85 65 - 99 mg/dL BUN 9 8 - 27 mg/dL Creatinine 1.19 0.76 - 1.27 mg/dL GFR est non-AA 63 >59 mL/min/1.73 GFR est AA 73 >59 mL/min/1.73  
 BUN/Creatinine ratio 8 (L) 10 - 24 Sodium 140 134 - 144 mmol/L Potassium 4.4 3.5 - 5.2 mmol/L Chloride 100 96 - 106 mmol/L  
 CO2 26 20 - 29 mmol/L Calcium 10.1 8.6 - 10.2 mg/dL Protein, total 7.4 6.0 - 8.5 g/dL Albumin 4.9 (H) 3.6 - 4.8 g/dL GLOBULIN, TOTAL 2.5 1.5 - 4.5 g/dL A-G Ratio 2.0 1.2 - 2.2 Bilirubin, total 0.4 0.0 - 1.2 mg/dL Alk. phosphatase 92 39 - 117 IU/L  
 AST (SGOT) 35 0 - 40 IU/L  
 ALT (SGPT) 26 0 - 44 IU/L Narrative Performed at:  [address]  [id number] : Bertha Castaneda MD, Phone:  [phone] CBC W/O DIFF Result Value Ref Range WBC 5.1 3.4 - 10.8 x10E3/uL  
 RBC 4.32 4.14 - 5.80 x10E6/uL HGB 14.7 13.0 - 17.7 g/dL HCT 42.8 37.5 - 51.0 % MCV 99 (H) 79 - 97 fL  
 MCH 34.0 (H) 26.6 - 33.0 pg  
 MCHC 34.3 31.5 - 35.7 g/dL  
 RDW 13.4 12.3 - 15.4 % PLATELET 785 883 - 333 x10E3/uL Narrative Performed at:  [address]  [id number] : Bertha Castaneda MD, Phone:  [phone] RECOMMENDATIONS: 6 month follow up Your lab results are very stable with normal liver enzymes and function. Continue all current medications and we will see you at your next scheduled appointment. Have a great day! Please call me if you have any questions: [phone] Sincerely, Barb Jamil NP

## 2018-11-14 NOTE — PROGRESS NOTES
1. Have you been to the ER, urgent care clinic since your last visit? Hospitalized since your last visit? No    2. Have you seen or consulted any other health care providers outside of the [address] [place] [address] since your last visit? Include any pap smears or colon screening. No   Chief Complaint   Patient presents with    Follow-up     fibroscan      Visit Vitals  BP (!) 191/100 (BP 1 Location: Right arm, BP Patient Position: Sitting)   Pulse (!) 53   Temp 96.8 °F (36 °C) (Tympanic)   Wt 158 lb 9.6 oz (71.9 kg)   SpO2 100%   BMI 20.92 kg/m²     PHQ over the last two weeks 11/14/2018   Little interest or pleasure in doing things Not at all   Feeling down, depressed, irritable, or hopeless Not at all   Total Score PHQ 2 0     Learning Assessment 11/14/2018   PRIMARY LEARNER Patient   BARRIERS PRIMARY LEARNER NONE   CO-LEARNER CAREGIVER No   PRIMARY LANGUAGE ENGLISH   LEARNER PREFERENCE PRIMARY LISTENING   LEARNING SPECIAL TOPICS -   ANSWERED BY patient   RELATIONSHIP SELF   ASSESSMENT COMMENT -     Abuse Screening Questionnaire 11/14/2018   Do you ever feel afraid of your partner? N   Are you in a relationship with someone who physically or mentally threatens you? N   Is it safe for you to go home? Y     Fall Risk Assessment, last 12 mths 11/14/2018   Able to walk? Yes   Fall in past 12 months?  No

## 2018-11-15 LAB
AFP L3 MFR SERPL: NORMAL % (ref 0–9.9)
AFP SERPL-MCNC: 1 NG/ML (ref 0–8)
ALBUMIN SERPL-MCNC: 4.9 G/DL (ref 3.6–4.8)
ALBUMIN/GLOB SERPL: 2 {RATIO} (ref 1.2–2.2)
ALP SERPL-CCNC: 92 IU/L (ref 39–117)
ALT SERPL-CCNC: 26 IU/L (ref 0–44)
AST SERPL-CCNC: 35 IU/L (ref 0–40)
BILIRUB SERPL-MCNC: 0.4 MG/DL (ref 0–1.2)
BUN SERPL-MCNC: 9 MG/DL (ref 8–27)
BUN/CREAT SERPL: 8 (ref 10–24)
CALCIUM SERPL-MCNC: 10.1 MG/DL (ref 8.6–10.2)
CHLORIDE SERPL-SCNC: 100 MMOL/L (ref 96–106)
CO2 SERPL-SCNC: 26 MMOL/L (ref 20–29)
CREAT SERPL-MCNC: 1.19 MG/DL (ref 0.76–1.27)
GLOBULIN SER CALC-MCNC: 2.5 G/DL (ref 1.5–4.5)
GLUCOSE SERPL-MCNC: 85 MG/DL (ref 65–99)
HBV DNA SERPL NAA+PROBE-ACNC: NORMAL IU/ML
HBV DNA SERPL NAA+PROBE-LOG IU: NORMAL LOG10 IU/ML
POTASSIUM SERPL-SCNC: 4.4 MMOL/L (ref 3.5–5.2)
PROT SERPL-MCNC: 7.4 G/DL (ref 6–8.5)
REF LAB TEST REF RANGE: NORMAL
SODIUM SERPL-SCNC: 140 MMOL/L (ref 134–144)

## 2018-12-17 ENCOUNTER — HOSPITAL ENCOUNTER (OUTPATIENT)
Dept: ULTRASOUND IMAGING | Age: 68
Discharge: HOME | End: 2018-12-17
Attending: NURSE PRACTITIONER
Payer: COMMERCIAL

## 2018-12-17 DIAGNOSIS — B18.2 CHRONIC HEPATITIS C WITHOUT HEPATIC COMA (HCC): ICD-10-CM

## 2018-12-17 DIAGNOSIS — B18.1 CHRONIC HEPATITIS B (HCC): ICD-10-CM

## 2018-12-17 PROCEDURE — 76705 ECHO EXAM OF ABDOMEN: CPT

## 2019-04-22 RX ORDER — GUAIFENESIN 600 MG/1
600 TABLET, EXTENDED RELEASE ORAL 2 TIMES DAILY
Qty: 60 TAB | Refills: 2 | Status: SHIPPED | OUTPATIENT
Start: 2019-04-22 | End: 2019-05-06 | Stop reason: ALTCHOICE

## 2019-04-22 NOTE — TELEPHONE ENCOUNTER
Patient came in with a very bad cough/ cold. He was wondering if Dr. Marshall Granda could call him in a prescription to break up the congestion in his chest.     Patient would like the prescription sent to :    [facility]   at [address] Please call the patient at [phone] if this can be done for him or if he would need to schedule an appt.

## 2019-05-06 ENCOUNTER — OFFICE VISIT (OUTPATIENT)
Dept: FAMILY MEDICINE CLINIC | Age: 69
End: 2019-05-06

## 2019-05-06 VITALS
DIASTOLIC BLOOD PRESSURE: 80 MMHG | HEIGHT: 73 IN | BODY MASS INDEX: 20.91 KG/M2 | TEMPERATURE: 97.3 F | RESPIRATION RATE: 16 BRPM | HEART RATE: 62 BPM | SYSTOLIC BLOOD PRESSURE: 147 MMHG | WEIGHT: 157.8 LBS | OXYGEN SATURATION: 100 %

## 2019-05-06 DIAGNOSIS — I10 ESSENTIAL HYPERTENSION: ICD-10-CM

## 2019-05-06 DIAGNOSIS — Z00.00 ROUTINE GENERAL MEDICAL EXAMINATION AT A HEALTH CARE FACILITY: ICD-10-CM

## 2019-05-06 DIAGNOSIS — Z12.5 PROSTATE CANCER SCREENING: Primary | ICD-10-CM

## 2019-05-06 DIAGNOSIS — E78.00 HYPERCHOLESTEROLEMIA: ICD-10-CM

## 2019-05-06 RX ORDER — ATORVASTATIN CALCIUM 40 MG/1
TABLET, FILM COATED ORAL
Qty: 90 TAB | Refills: 3 | Status: SHIPPED | OUTPATIENT
Start: 2019-05-06 | End: 2020-04-29

## 2019-05-06 RX ORDER — HYDROCHLOROTHIAZIDE 12.5 MG/1
CAPSULE ORAL
Qty: 90 CAP | Refills: 3 | Status: SHIPPED | OUTPATIENT
Start: 2019-05-06 | End: 2020-07-27 | Stop reason: SDUPTHER

## 2019-05-06 RX ORDER — AMLODIPINE BESYLATE 10 MG/1
10 TABLET ORAL DAILY
Qty: 90 TAB | Refills: 6 | Status: SHIPPED | OUTPATIENT
Start: 2019-05-06 | End: 2020-07-30 | Stop reason: SDUPTHER

## 2019-05-06 NOTE — PROGRESS NOTES
Chief Complaint   Patient presents with    Complete Physical     1. Have you been to the ER, urgent care clinic since your last visit? Hospitalized since your last visit? No    2. Have you seen or consulted any other health care providers outside of the [address] [place] [address] since your last visit? Include any pap smears or colon screening. No      Pt unsure which HTN medication he is taking.

## 2019-05-06 NOTE — PROGRESS NOTES
HISTORY OF PRESENT ILLNESS  Tee Fry is a 81 y.o. male. HPI in for physical. Needs blood pressure and cholesterol checked. Had a bad cold a few weeks ago that is getting over now. Review of Systems   Constitutional: Negative for malaise/fatigue and weight loss. HENT: Negative for hearing loss and tinnitus. Eyes: Negative for blurred vision and double vision. Respiratory: Negative for shortness of breath. Smokes an occasional cigar. Cardiovascular: Negative for chest pain and orthopnea. Gastrointestinal: Negative for abdominal pain and blood in stool. Genitourinary: Negative for dysuria and hematuria. Skin: Negative for itching and rash. Neurological: Negative for loss of consciousness and weakness. Psychiatric/Behavioral: The patient is not nervous/anxious and does not have insomnia. Physical Exam   Constitutional: He appears well-developed and well-nourished. HENT:   Right Ear: External ear normal.   Left Ear: External ear normal.   Mouth/Throat: Oropharynx is clear and moist.   Neck: No thyromegaly present. Cardiovascular: Normal rate, regular rhythm and normal heart sounds. Bilateral femoral bruits. Absent pulses on R foot   Pulmonary/Chest: Effort normal and breath sounds normal. No respiratory distress. He has no wheezes. Abdominal: Soft. Bowel sounds are normal. He exhibits no distension and no mass. There is no tenderness. There is no guarding. Musculoskeletal: Normal range of motion. He exhibits no edema. Lymphadenopathy:     He has no cervical adenopathy. Nursing note and vitals reviewed. ASSESSMENT and PLAN  Orders Placed This Encounter    LIPID PANEL    CBC WITH AUTOMATED DIFF    METABOLIC PANEL, COMPREHENSIVE    PROSTATE SPECIFIC AG    hydroCHLOROthiazide (MICROZIDE) 12.5 mg capsule    amLODIPine (NORVASC) 10 mg tablet    atorvastatin (LIPITOR) 40 mg tablet     Diagnoses and all orders for this visit:    1.  Prostate cancer screening  - PSA, DIAGNOSTIC (PROSTATE SPECIFIC AG)    2. Essential hypertension  -     CBC WITH AUTOMATED DIFF  -     METABOLIC PANEL, COMPREHENSIVE    3. Hypercholesterolemia  -     LIPID PANEL    4. Routine general medical examination at a health care facility  -     hydroCHLOROthiazide (MICROZIDE) 12.5 mg capsule; TAKE ONE CAPSULE EVERY DAY FOR FOR BLOOD PRESSURE  -     atorvastatin (LIPITOR) 40 mg tablet; TAKE 1 TABLET EVERY DAY for cholesterol    Other orders  -     amLODIPine (NORVASC) 10 mg tablet; Take 1 Tab by mouth daily.  For blood pressure

## 2019-05-07 LAB
ALBUMIN SERPL-MCNC: 4.5 G/DL (ref 3.6–4.8)
ALBUMIN/GLOB SERPL: 1.5 {RATIO} (ref 1.2–2.2)
ALP SERPL-CCNC: 90 IU/L (ref 39–117)
ALT SERPL-CCNC: 14 IU/L (ref 0–44)
AST SERPL-CCNC: 20 IU/L (ref 0–40)
BASOPHILS # BLD AUTO: 0 X10E3/UL (ref 0–0.2)
BASOPHILS NFR BLD AUTO: 1 %
BILIRUB SERPL-MCNC: 0.4 MG/DL (ref 0–1.2)
BUN SERPL-MCNC: 9 MG/DL (ref 8–27)
BUN/CREAT SERPL: 8 (ref 10–24)
CALCIUM SERPL-MCNC: 9.9 MG/DL (ref 8.6–10.2)
CHLORIDE SERPL-SCNC: 102 MMOL/L (ref 96–106)
CHOLEST SERPL-MCNC: 153 MG/DL (ref 100–199)
CO2 SERPL-SCNC: 24 MMOL/L (ref 20–29)
CREAT SERPL-MCNC: 1.09 MG/DL (ref 0.76–1.27)
EOSINOPHIL # BLD AUTO: 0.1 X10E3/UL (ref 0–0.4)
EOSINOPHIL NFR BLD AUTO: 1 %
ERYTHROCYTE [DISTWIDTH] IN BLOOD BY AUTOMATED COUNT: 14.5 % (ref 12.3–15.4)
GLOBULIN SER CALC-MCNC: 3 G/DL (ref 1.5–4.5)
GLUCOSE SERPL-MCNC: 110 MG/DL (ref 65–99)
HCT VFR BLD AUTO: 38.9 % (ref 37.5–51)
HDLC SERPL-MCNC: 56 MG/DL
HGB BLD-MCNC: 13 G/DL (ref 13–17.7)
IMM GRANULOCYTES # BLD AUTO: 0 X10E3/UL (ref 0–0.1)
IMM GRANULOCYTES NFR BLD AUTO: 0 %
INTERPRETATION, 910389: NORMAL
LDLC SERPL CALC-MCNC: 80 MG/DL (ref 0–99)
LYMPHOCYTES # BLD AUTO: 1.8 X10E3/UL (ref 0.7–3.1)
LYMPHOCYTES NFR BLD AUTO: 39 %
MCH RBC QN AUTO: 33.2 PG (ref 26.6–33)
MCHC RBC AUTO-ENTMCNC: 33.4 G/DL (ref 31.5–35.7)
MCV RBC AUTO: 99 FL (ref 79–97)
MONOCYTES # BLD AUTO: 0.4 X10E3/UL (ref 0.1–0.9)
MONOCYTES NFR BLD AUTO: 8 %
NEUTROPHILS # BLD AUTO: 2.3 X10E3/UL (ref 1.4–7)
NEUTROPHILS NFR BLD AUTO: 51 %
PLATELET # BLD AUTO: 457 X10E3/UL (ref 150–379)
POTASSIUM SERPL-SCNC: 4.1 MMOL/L (ref 3.5–5.2)
PROT SERPL-MCNC: 7.5 G/DL (ref 6–8.5)
PSA SERPL-MCNC: 0.5 NG/ML (ref 0–4)
RBC # BLD AUTO: 3.92 X10E6/UL (ref 4.14–5.8)
SODIUM SERPL-SCNC: 142 MMOL/L (ref 134–144)
TRIGL SERPL-MCNC: 84 MG/DL (ref 0–149)
VLDLC SERPL CALC-MCNC: 17 MG/DL (ref 5–40)
WBC # BLD AUTO: 4.5 X10E3/UL (ref 3.4–10.8)

## 2019-07-09 ENCOUNTER — OFFICE VISIT (OUTPATIENT)
Dept: HEMATOLOGY | Age: 69
End: 2019-07-09

## 2019-07-09 VITALS
SYSTOLIC BLOOD PRESSURE: 145 MMHG | WEIGHT: 157.6 LBS | OXYGEN SATURATION: 100 % | DIASTOLIC BLOOD PRESSURE: 60 MMHG | BODY MASS INDEX: 20.79 KG/M2 | HEART RATE: 65 BPM | TEMPERATURE: 97 F

## 2019-07-09 DIAGNOSIS — B18.2 CHRONIC HEPATITIS C WITHOUT HEPATIC COMA (HCC): Primary | ICD-10-CM

## 2019-07-09 DIAGNOSIS — B18.1 CHRONIC HEPATITIS B (HCC): ICD-10-CM

## 2019-07-09 DIAGNOSIS — Z86.19 HEPATITIS C VIRUS INFECTION CURED AFTER ANTIVIRAL DRUG THERAPY: ICD-10-CM

## 2019-07-09 NOTE — PROGRESS NOTES
1. Have you been to the ER, urgent care clinic since your last visit? Hospitalized since your last visit? No    2. Have you seen or consulted any other health care providers outside of the [address] [place] [address] since your last visit? Include any pap smears or colon screening. No   Chief Complaint   Patient presents with    Follow-up     Visit Vitals  /60 (BP 1 Location: Left arm, BP Patient Position: Sitting)   Pulse 65   Temp 97 °F (36.1 °C) (Tympanic)   Wt 157 lb 9.6 oz (71.5 kg)   SpO2 100%   BMI 20.79 kg/m²     3 most recent PHQ Screens 7/9/2019   Little interest or pleasure in doing things Not at all   Feeling down, depressed, irritable, or hopeless Not at all   Total Score PHQ 2 0     Learning Assessment 11/14/2018   PRIMARY LEARNER Patient   BARRIERS PRIMARY LEARNER NONE   CO-LEARNER CAREGIVER No   PRIMARY LANGUAGE ENGLISH   LEARNER PREFERENCE PRIMARY LISTENING   LEARNING SPECIAL TOPICS -   ANSWERED BY patient   RELATIONSHIP SELF   ASSESSMENT COMMENT -     Abuse Screening Questionnaire 7/9/2019   Do you ever feel afraid of your partner? N   Are you in a relationship with someone who physically or mentally threatens you? N   Is it safe for you to go home? Y     Fall Risk Assessment, last 12 mths 7/9/2019   Able to walk? Yes   Fall in past 12 months?  No

## 2019-07-09 NOTE — PROGRESS NOTES
[address] MD Dennis, [place], [place], [place]       HILARY England PA-C Hyland Ireland, [facility]   HILARY Lofton NP        at [address]     [address] [place] [address], [facility][note 3] Nimo Fernandes  22.     [phone]     FAX: [phone]    at [address], [address]     [phone]     FAX: [phone]     Patient Care Team:  Jared Tony MD as PCP - General  Oniel Swann MD (Gastroenterology)  Sarmad Joseph MD as Physician (Cardiology)    Patient Active Problem List   Diagnosis Code    LBP (low back pain) M54.5    Chronic hepatitis B ([facility] [address] B18.1    Other chest pain R07.89    Essential hypertension I10    High cholesterol E78.00    Hepatitis C virus infection cured after antiviral drug therapy Z86.19     Roslynn Ahumada is a 81 y.o. male who returns to the [facility] for monitoring of chronic HCV and HBV co-infection with bridging fibrosis on liver biopsy in 2013. The active problem list, all pertinent past medical history, medications, liver histology, endoscopic studies, radiologic findings and laboratory findings related to the liver disorder were reviewed with the patient. The patient has inactive e-antigen negative HBV with HBV DNA of under 2,000 IU/ml and normal liver enzymes. Treatment is not indicated at this time. The patient is a non-responder to previous treatment with peginterferon and ribavirin. He was cured with 12 weeks of re-treatment of Harvoni (5/7/2015-8/17/2015). Today, patient feels well with no new physical complaints today. The patient completes all daily activities without any functional limitations. The patient has not experienced fatigue, pain in the right side over the liver, arthralgias or myalgias.     ALLERGIES  No Known Allergies    Current Outpatient Medications   Medication Sig    hydroCHLOROthiazide (MICROZIDE) 12.5 mg capsule TAKE ONE CAPSULE EVERY DAY FOR FOR BLOOD PRESSURE    amLODIPine (NORVASC) 10 mg tablet Take 1 Tab by mouth daily. For blood pressure    atorvastatin (LIPITOR) 40 mg tablet TAKE 1 TABLET EVERY DAY for cholesterol    aspirin 81 mg chewable tablet Take 1 Tab by mouth daily. No current facility-administered medications for this visit. REVIEW OF SYSTEMS:  Constitution systems: Negative for fever, chills, weight gain, weight loss. Eyes: Negative for diplopia, visual changes, eye pain. ENT: Negative for sore throat, painful swallowing. Respiratory: Negative for cough, hemoptysis, dyspnea. Cardiology: Negative for chest pain, palpitations. GI:  Negative for constipation or diarrhea. : Negative for urinary frequency, dysuria and hematuria. Skin: Negative for rash. Hematology: Negative for easy bruising, bleeding from gums or nose. Musculo-skeletal: Negative for back pain, muscle pain, weakness. Neurologic: Negative for headaches, dizziness, vertigo, memory problems. Psychology: Negative for anxiety, depression. PHYSICAL EXAMINATION:  Visit Vitals  /60 (BP 1 Location: Left arm, BP Patient Position: Sitting)   Pulse 65   Temp 97 °F (36.1 °C) (Tympanic)   Wt 157 lb 9.6 oz (71.5 kg)   SpO2 100%   BMI 20.79 kg/m²     General: No acute distress. Eyes: Sclera anicteric. ENT: No oral lesions. Nodes: No adenopathy. Skin: No spider angiomata. No jaundice. No palmar erythema. Respiratory: Lungs clear to auscultation. Cardiovascular: Regular heart rate. No murmurs. No JVD. Abdomen: Soft non-tender. Liver size normal to percussion/palpation. Spleen not palpable. No obvious ascites. Extremities: No edema. No muscle wasting. No gross arthritic changes. Neurologic: Alert and oriented. Cranial nerves grossly intact. No asterixis.     LABORATORY STUDIES:  Liver [place] of [address] [place] [address] & Units 5/6/2019 11/14/2018 2/5/2018   WBC 3.4 - 10.8 x10E3/uL 4.5 5.1 5.4   ANC 1.4 - 7.0 x10E3/uL 2.3  3.1   HGB 13.0 - 17.7 g/dL 13.0 14.7 13.4    - 379 x10E3/uL 457 (H) 248 243   AST 0 - 40 IU/L 20 35 34   ALT 0 - 44 IU/L 14 26 29   Alk Phos 39 - 117 IU/L 90 92 78   Bili, Total 0.0 - 1.2 mg/dL 0.4 0.4 0.3   Bili, Direct 0.00 - 0.40 mg/dL      Albumin 3.6 - 4.8 g/dL 4.5 4.9 (H) 4.9 (H)   BUN 8 - 27 mg/dL 9 9 12   Creat 0.76 - 1.27 mg/dL 1.09 1.19 1.10   Na 134 - 144 mmol/L 142 140 142   K 3.5 - 5.2 mmol/L 4.1 4.4 4.2   Cl 96 - 106 mmol/L 102 100 100   CO2 20 - 29 mmol/L 24 26 26   Glucose 65 - 99 mg/dL 110 (H) 85 112 (H)     SEROLOGIES:  Virology Latest Ref Rng & Units 11/14/2018   HBV DNA IU/mL HBV DNA not detected   HCV RNA, NORA, QL Negative      Virology Latest Ref Rng & Units 4/3/2017   HBV DNA IU/mL HBV DNA not detected   HCV RNA, NORA, QL Negative Negative     Serologies Latest Ref Rng & Units 6/8/2015 11/13/2014   HCV RT-PCR, Quant IU/mL 240 2,966,392     2/2013. HBEantigen negative, anti-HBE positive, HBV  IU/ml, HCV RNA Log 7.2 IU/ml, antiHDV negative. 1/2013. HBSurface antigen positive. Anti-HIV negative. LIVER HISTOLOGY:  11/2018. FibroScan performed at [address]. EkPa was 5.9. Suggested fibrosis level is F0.    11/2017. FibroScan performed at [address]. EkPa was 7.9. Suggested fibrosis level is F2.    3/2013. Slides reviewed by MLS. Moderate hepatitis with septal fibrosis. Knodell Score is 8 (3,1,3,1). Domitila fibrosis score is 3. ENDOSCOPIC PROCEDURES:  6/2006. Colonoscopy by Dr Daysi Correa. Single small polyp in rectum. Diverticulosis. RADIOLOGY:  4/2017. Ultrasound of liver. Echogenic consistent with cirrhosis. No liver mass lesions. No dilated bile ducts. No ascites. 3/2013. Ultrasound of liver. Normal appearing liver. No liver mass lesions. 6/2010. CT scan abdomen with and without IV contrast.  Normal appearing liver.   A few scattered small cysts. No liver mass lesions. No dilated bile ducts. No bile duct strictures. No ascites. OTHER TESTING:  Not available or performed    ASSESSMENT AND PLAN:    Chronic HCV cured with antiviral therapy with no fibrosis. This was confirmed with FibroScan. Results were discussed in detail with patient. Liver enzymes and function remain normal. Will continue to monitor patient regularly. HCV treatment. Cured from 12 weeks of treatment with Harvoni in August 2015. Chronic inactive HBV. E-antigen negative. HBV DNA remains not detected/suppressed. He will need long-term follow up to monitor this. FibroScan. He will continue to have this annually to reassess his liver fibrosis. Next due November 2019. [facility] [address] surveillance. Ordered ultrasound and AFP today. Hypertension. Patient's BP is significantly elevated in the clinic today. Discussed the importance of regular follow up with their PCP to monitor/manage this. Patient verbalized understanding. The patient was directed to continue all current medications at the current dosages. There are no contraindications for the patient to take any medications that are necessary for treatment of other medical issues. The patient was counseled regarding alcohol consumption. [address] [place] [address] in 6 months for follow up and repeat of FibroScan analysis.      Halle Whiting, [facility]  Liver [place] [id number] [facility], [address] Nimo Fernandes  22.  [phone]

## 2019-07-10 LAB
AFP L3 MFR SERPL: NORMAL % (ref 0–9.9)
AFP SERPL-MCNC: 1.3 NG/ML (ref 0–8)
ALBUMIN SERPL-MCNC: 4.9 G/DL (ref 3.6–4.8)
ALP SERPL-CCNC: 84 IU/L (ref 39–117)
ALT SERPL-CCNC: 32 IU/L (ref 0–44)
AST SERPL-CCNC: 33 IU/L (ref 0–40)
BILIRUB DIRECT SERPL-MCNC: 0.16 MG/DL (ref 0–0.4)
BILIRUB SERPL-MCNC: 0.5 MG/DL (ref 0–1.2)
BUN SERPL-MCNC: 8 MG/DL (ref 8–27)
BUN/CREAT SERPL: 7 (ref 10–24)
CALCIUM SERPL-MCNC: 10 MG/DL (ref 8.6–10.2)
CHLORIDE SERPL-SCNC: 100 MMOL/L (ref 96–106)
CO2 SERPL-SCNC: 23 MMOL/L (ref 20–29)
CREAT SERPL-MCNC: 1.07 MG/DL (ref 0.76–1.27)
ERYTHROCYTE [DISTWIDTH] IN BLOOD BY AUTOMATED COUNT: 13.9 % (ref 12.3–15.4)
GLUCOSE SERPL-MCNC: 166 MG/DL (ref 65–99)
HCT VFR BLD AUTO: 37.2 % (ref 37.5–51)
HGB BLD-MCNC: 13.2 G/DL (ref 13–17.7)
INR PPP: 1.1 (ref 0.8–1.2)
MCH RBC QN AUTO: 33.7 PG (ref 26.6–33)
MCHC RBC AUTO-ENTMCNC: 35.5 G/DL (ref 31.5–35.7)
MCV RBC AUTO: 95 FL (ref 79–97)
PLATELET # BLD AUTO: 276 X10E3/UL (ref 150–450)
POTASSIUM SERPL-SCNC: 4.4 MMOL/L (ref 3.5–5.2)
PROT SERPL-MCNC: 7.2 G/DL (ref 6–8.5)
PROTHROMBIN TIME: 11.5 SEC (ref 9.1–12)
RBC # BLD AUTO: 3.92 X10E6/UL (ref 4.14–5.8)
SODIUM SERPL-SCNC: 145 MMOL/L (ref 134–144)
WBC # BLD AUTO: 4 X10E3/UL (ref 3.4–10.8)

## 2019-07-11 LAB
HBV DNA SERPL NAA+PROBE-ACNC: NORMAL IU/ML
HBV DNA SERPL NAA+PROBE-LOG IU: NORMAL LOG10 IU/ML
HCV RNA SERPL QL NAA+PROBE: NEGATIVE
REF LAB TEST REF RANGE: NORMAL

## 2019-07-22 ENCOUNTER — HOSPITAL ENCOUNTER (OUTPATIENT)
Dept: ULTRASOUND IMAGING | Age: 69
Discharge: HOME | End: 2019-07-22
Attending: NURSE PRACTITIONER
Payer: COMMERCIAL

## 2019-07-22 DIAGNOSIS — B18.1 CHRONIC HEPATITIS B (HCC): ICD-10-CM

## 2019-07-22 PROCEDURE — 76700 US EXAM ABDOM COMPLETE: CPT

## 2020-02-24 ENCOUNTER — OFFICE VISIT (OUTPATIENT)
Dept: HEMATOLOGY | Age: 70
End: 2020-02-24

## 2020-02-24 VITALS
HEIGHT: 73 IN | OXYGEN SATURATION: 99 % | HEART RATE: 88 BPM | SYSTOLIC BLOOD PRESSURE: 137 MMHG | DIASTOLIC BLOOD PRESSURE: 67 MMHG | BODY MASS INDEX: 20.94 KG/M2 | WEIGHT: 158 LBS | TEMPERATURE: 97.1 F

## 2020-02-24 DIAGNOSIS — Z86.19 HEPATITIS C VIRUS INFECTION CURED AFTER ANTIVIRAL DRUG THERAPY: ICD-10-CM

## 2020-02-24 DIAGNOSIS — B18.1 CHRONIC HEPATITIS B (HCC): Primary | ICD-10-CM

## 2020-02-24 NOTE — PROGRESS NOTES
[facility], MD, MD Juan Carlos Goodman PA-C Raj Castor, [facility]     April S [facility]   Miriam Lange ZEN [facility]       Jaqueline Yung [facility] [address]    at [address], [address] [place] [address] 22.    [phone]    FAX: [address] [place] [address], [address]    [phone]    FAX: [phone]     Patient Care Team:  Hans Rascon MD as PCP - Pieter Jon MD as PCP - [facility] [place] Provider  Андрей Goldstein MD (Gastroenterology)  Mary Reyna MD as Physician (Cardiology)    Patient Active Problem List   Diagnosis Code    LBP (low back pain) M54.5    Chronic hepatitis B ([facility] [address] B18.1    Other chest pain R07.89    Essential hypertension I10    High cholesterol E78.00    Hepatitis C virus infection cured after antiviral drug therapy Z86.19     Amor Hawkins is a 81 y.o. 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 The active problem list, all pertinent past medical history, medications, liver histology, endoscopic studies, radiologic findings and laboratory findings related to the liver disorder were reviewed with the patient. The patient has inactive e-antigen negative HBV with HBV DNA of under 2,000 IU/ml and normal liver enzymes. Treatment is not indicated at this time. The patient is a non-responder to previous treatment with peginterferon and ribavirin. He was cured with 12 weeks of re-treatment of Harvoni (5/7/2015-8/17/2015). Today, patient feels well with no new physical complaints today.  The patient completes all daily activities without any functional limitations. The patient has not experienced fatigue, pain in the right side over the liver, arthralgias or myalgias. ALLERGIES  No Known Allergies    Current Outpatient Medications   Medication Sig    hydroCHLOROthiazide (MICROZIDE) 12.5 mg capsule TAKE ONE CAPSULE EVERY DAY FOR FOR BLOOD PRESSURE    amLODIPine (NORVASC) 10 mg tablet Take 1 Tab by mouth daily. For blood pressure    atorvastatin (LIPITOR) 40 mg tablet TAKE 1 TABLET EVERY DAY for cholesterol    aspirin 81 mg chewable tablet Take 1 Tab by mouth daily. No current facility-administered medications for this visit. REVIEW OF SYSTEMS:  Constitution systems: Negative for fever, chills, weight gain, weight loss. Eyes: Negative for diplopia, visual changes, eye pain. ENT: Negative for sore throat, painful swallowing. Respiratory: Negative for cough, hemoptysis, dyspnea. Cardiology: Negative for chest pain, palpitations. GI:  Negative for constipation or diarrhea. : Negative for urinary frequency, dysuria and hematuria. Skin: Negative for rash. Hematology: Negative for easy bruising, bleeding from gums or nose. Musculo-skeletal: Negative for back pain, muscle pain, weakness. Neurologic: Negative for headaches, dizziness, vertigo, memory problems. Psychology: Negative for anxiety, depression. PHYSICAL EXAMINATION:  Visit Vitals  /67 (BP 1 Location: Left arm, BP Patient Position: Sitting)   Pulse 88   Temp 97.1 °F (36.2 °C) (Tympanic)   Ht 6' 1\" (1.854 m)   Wt 158 lb (71.7 kg)   SpO2 99%   BMI 20.85 kg/m²     General: No acute distress. Eyes: Sclera anicteric. ENT: No oral lesions. Nodes: No adenopathy. Skin: No spider angiomata. No jaundice. No palmar erythema. Respiratory: Lungs clear to auscultation. Cardiovascular: Regular heart rate. No murmurs. No JVD. Abdomen: Soft non-tender. Liver size normal to percussion/palpation. Spleen not palpable.  No obvious ascites. Extremities: No edema. No muscle wasting. No gross arthritic changes. Neurologic: Alert and oriented. Cranial nerves grossly intact. No asterixis. LABORATORY STUDIES:  Liver [place] of [address] [place] [address] & Units 5/6/2019 11/14/2018 2/5/2018   WBC 3.4 - 10.8 x10E3/uL 4.5 5.1 5.4   ANC 1.4 - 7.0 x10E3/uL 2.3  3.1   HGB 13.0 - 17.7 g/dL 13.0 14.7 13.4    - 379 x10E3/uL 457 (H) 248 243   AST 0 - 40 IU/L 20 35 34   ALT 0 - 44 IU/L 14 26 29   Alk Phos 39 - 117 IU/L 90 92 78   Bili, Total 0.0 - 1.2 mg/dL 0.4 0.4 0.3   Bili, Direct 0.00 - 0.40 mg/dL      Albumin 3.6 - 4.8 g/dL 4.5 4.9 (H) 4.9 (H)   BUN 8 - 27 mg/dL 9 9 12   Creat 0.76 - 1.27 mg/dL 1.09 1.19 1.10   Na 134 - 144 mmol/L 142 140 142   K 3.5 - 5.2 mmol/L 4.1 4.4 4.2   Cl 96 - 106 mmol/L 102 100 100   CO2 20 - 29 mmol/L 24 26 26   Glucose 65 - 99 mg/dL 110 (H) 85 112 (H)     SEROLOGIES:  Virology Latest Ref Rng & Units 7/9/2019   HBV DNA IU/mL HBV DNA not detected   HCV RNA, NORA, QL Negative Negative     Virology Latest Ref Rng & Units 11/14/2018   HBV DNA IU/mL HBV DNA not detected   HCV RNA, NORA, QL Negative      Virology Latest Ref Rng & Units 4/3/2017   HBV DNA IU/mL HBV DNA not detected   HCV RNA, NORA, QL Negative Negative     Serologies Latest Ref Rng & Units 6/8/2015 11/13/2014   HCV RT-PCR, Quant IU/mL 240 3,498,568     2/2013. HBEantigen negative, HBE antibody positive, HBV  IU/ml, HCV RNA log 7.2 IU/ml, HDV antibody negative. 1/2013. HBSurface antigen positive. HIV antibody negative. LIVER HISTOLOGY:  2/2020. FibroScan performed at [facility]. EkPa was 9.8. IQR/med 11%. . The results suggested a fibrosis level of F2. The CAP score suggests no evidence of fatty liver. 11/2018. FibroScan performed at [facility]. EkPa was 5.9. Suggested fibrosis level is F0.    11/2017. FibroScan performed at [facility]. EkPa was 7.9.  Suggested fibrosis level is F2.    3/2013. Slides reviewed by MLS. Moderate hepatitis with septal fibrosis. Knodell Score is 8 (3,1,3,1). Dmoitila fibrosis score is 3. ENDOSCOPIC PROCEDURES:  6/2006. Colonoscopy by Dr Jenni Spurling. Single small polyp in rectum. Diverticulosis. RADIOLOGY:  4/2017. Ultrasound of liver. Echogenic consistent with cirrhosis. No liver mass lesions. No dilated bile ducts. No ascites. 3/2013. Ultrasound of liver. Normal appearing liver. No liver mass lesions. 6/2010. CT scan abdomen with and without IV contrast. Normal appearing liver. A few scattered small cysts. No liver mass lesions. No dilated bile ducts. No bile duct strictures. No ascites. OTHER TESTING:  Not available or performed    ASSESSMENT AND PLAN:    Chronic HCV cured with antiviral therapy with no fibrosis. This was confirmed with FibroScan. Results were discussed in detail with patient. Liver enzymes and function remain normal. Will continue to monitor patient regularly. HCV treatment. Cured from 12 weeks of treatment with Harvoni in August 2015. Chronic inactive HBV. E-antigen negative. HBV DNA remains not detected/suppressed. He will need long-term follow up to monitor this. [place] [address]. screening. Negative 8/2019. Ordered AFP and US today. FibroScan. He will continue to have this annually to reassess his liver fibrosis. Next due 2/2021. Hypertension. This is controlled today. Continue follow up with PCP. Treatment of other medical diseases with chronic liver disease. The patient was directed to continue all current medications at the current dosages. There are no contraindications for the patient to take any medications necessary for treatment of other medical issues. Alcohol use in chronic liver disease. The patient was counseled regarding alcohol consumption. Follow up. Follow up at the [place] [address] in 6 months for [place] [address]. screening.      Savannah Hurd, [facility]anner[facility]-BC  [address] [facility] [address], [address] Nimo Fernandes  22.  [phone]

## 2020-02-24 NOTE — PROGRESS NOTES
Neto Fuentes is a 81 y.o. male  Chief Complaint   Patient presents with    Follow-up     fibroscan         Visit Vitals  /67 (BP 1 Location: Left arm, BP Patient Position: Sitting)   Pulse 88   Temp 97.1 °F (36.2 °C) (Tympanic)   Ht 6' 1\" (1.854 m)   Wt 158 lb (71.7 kg)   SpO2 99%   BMI 20.85 kg/m²     3 most recent PHQ Screens 2/24/2020   Little interest or pleasure in doing things Not at all   Feeling down, depressed, irritable, or hopeless Not at all   Total Score PHQ 2 0     Learning Assessment 2/24/2020   PRIMARY LEARNER Patient   BARRIERS PRIMARY LEARNER NONE   CO-LEARNER CAREGIVER No   PRIMARY LANGUAGE ENGLISH   LEARNER PREFERENCE PRIMARY DEMONSTRATION   LEARNING SPECIAL TOPICS -   ANSWERED BY patient   RELATIONSHIP SELF   ASSESSMENT COMMENT -     Abuse Screening Questionnaire 2/24/2020   Do you ever feel afraid of your partner? N   Are you in a relationship with someone who physically or mentally threatens you? N   Is it safe for you to go home? Y         1. Have you been to the ER, urgent care clinic since your last visit? Hospitalized since your last visit? No    2. Have you seen or consulted any other health care providers outside of the [address] [place] [address] since your last visit? Include any pap smears or colon screening.  No

## 2020-02-25 LAB
ALBUMIN SERPL-MCNC: 5.3 G/DL (ref 3.8–4.8)
ALP SERPL-CCNC: 86 IU/L (ref 39–117)
ALT SERPL-CCNC: 39 IU/L (ref 0–44)
AST SERPL-CCNC: 37 IU/L (ref 0–40)
BILIRUB DIRECT SERPL-MCNC: 0.16 MG/DL (ref 0–0.4)
BILIRUB SERPL-MCNC: 0.5 MG/DL (ref 0–1.2)
BUN SERPL-MCNC: 8 MG/DL (ref 8–27)
BUN/CREAT SERPL: 7 (ref 10–24)
CALCIUM SERPL-MCNC: 10.4 MG/DL (ref 8.6–10.2)
CHLORIDE SERPL-SCNC: 100 MMOL/L (ref 96–106)
CO2 SERPL-SCNC: 23 MMOL/L (ref 20–29)
CREAT SERPL-MCNC: 1.11 MG/DL (ref 0.76–1.27)
ERYTHROCYTE [DISTWIDTH] IN BLOOD BY AUTOMATED COUNT: 12.2 % (ref 11.6–15.4)
GLUCOSE SERPL-MCNC: 104 MG/DL (ref 65–99)
HBV DNA SERPL NAA+PROBE-ACNC: NORMAL IU/ML
HBV DNA SERPL NAA+PROBE-LOG IU: NORMAL LOG10 IU/ML
HCT VFR BLD AUTO: 40.5 % (ref 37.5–51)
HGB BLD-MCNC: 13.7 G/DL (ref 13–17.7)
INR PPP: 1.1 (ref 0.8–1.2)
MCH RBC QN AUTO: 33.6 PG (ref 26.6–33)
MCHC RBC AUTO-ENTMCNC: 33.8 G/DL (ref 31.5–35.7)
MCV RBC AUTO: 99 FL (ref 79–97)
PLATELET # BLD AUTO: 277 X10E3/UL (ref 150–450)
POTASSIUM SERPL-SCNC: 3.8 MMOL/L (ref 3.5–5.2)
PROT SERPL-MCNC: 7.6 G/DL (ref 6–8.5)
PROTHROMBIN TIME: 11.8 SEC (ref 9.1–12)
RBC # BLD AUTO: 4.08 X10E6/UL (ref 4.14–5.8)
REF LAB TEST REF RANGE: NORMAL
SODIUM SERPL-SCNC: 141 MMOL/L (ref 134–144)
WBC # BLD AUTO: 4.6 X10E3/UL (ref 3.4–10.8)

## 2020-02-26 LAB
AFP L3 MFR SERPL: NORMAL % (ref 0–9.9)
AFP SERPL-MCNC: 1.2 NG/ML (ref 0–8)
HCV RNA SERPL QL NAA+PROBE: NEGATIVE

## 2020-03-02 ENCOUNTER — HOSPITAL ENCOUNTER (OUTPATIENT)
Dept: ULTRASOUND IMAGING | Age: 70
Discharge: HOME | End: 2020-03-02
Attending: NURSE PRACTITIONER
Payer: COMMERCIAL

## 2020-03-02 DIAGNOSIS — B18.1 CHRONIC HEPATITIS B (HCC): ICD-10-CM

## 2020-03-02 PROCEDURE — 76700 US EXAM ABDOM COMPLETE: CPT

## 2020-04-04 NOTE — PROGRESS NOTES
HCV/HBV undet. AFP and US normal. Letter sent for all results as well as to call ofc to schedule 6 month follow up appointment.

## 2020-04-07 ENCOUNTER — DOCUMENTATION ONLY (OUTPATIENT)
Dept: HEMATOLOGY | Age: 70
End: 2020-04-07

## 2020-04-15 ENCOUNTER — DOCUMENTATION ONLY (OUTPATIENT)
Dept: HEMATOLOGY | Age: 70
End: 2020-04-15

## 2020-04-29 DIAGNOSIS — Z00.00 ROUTINE GENERAL MEDICAL EXAMINATION AT A HEALTH CARE FACILITY: ICD-10-CM

## 2020-04-29 RX ORDER — ATORVASTATIN CALCIUM 40 MG/1
TABLET, FILM COATED ORAL
Qty: 90 TAB | Refills: 3 | Status: SHIPPED | OUTPATIENT
Start: 2020-04-29 | End: 2021-05-05

## 2020-07-27 DIAGNOSIS — Z00.00 ROUTINE GENERAL MEDICAL EXAMINATION AT A HEALTH CARE FACILITY: ICD-10-CM

## 2020-07-27 RX ORDER — HYDROCHLOROTHIAZIDE 12.5 MG/1
CAPSULE ORAL
Qty: 90 CAP | Refills: 3 | Status: SHIPPED | OUTPATIENT
Start: 2020-07-27 | End: 2021-08-04

## 2020-07-30 RX ORDER — AMLODIPINE BESYLATE 10 MG/1
10 TABLET ORAL DAILY
Qty: 90 TAB | Refills: 6 | Status: SHIPPED | OUTPATIENT
Start: 2020-07-30 | End: 2021-08-05

## 2020-07-30 NOTE — TELEPHONE ENCOUNTER
Last visit:5/06/19  Next visit:not scheduled  Previous refill 5/6/19(90+6R)    Requested Prescriptions     Pending Prescriptions Disp Refills    amLODIPine (NORVASC) 10 mg tablet 90 Tab 6     Sig: Take 1 Tab by mouth daily.  For blood pressure

## 2020-11-03 ENCOUNTER — APPOINTMENT (OUTPATIENT)
Dept: GENERAL RADIOLOGY | Age: 70
End: 2020-11-03
Attending: EMERGENCY MEDICINE
Payer: COMMERCIAL

## 2020-11-03 ENCOUNTER — APPOINTMENT (OUTPATIENT)
Dept: CT IMAGING | Age: 70
End: 2020-11-03
Attending: EMERGENCY MEDICINE
Payer: COMMERCIAL

## 2020-11-03 ENCOUNTER — HOSPITAL ENCOUNTER (EMERGENCY)
Age: 70
Discharge: HOME | End: 2020-11-03
Attending: EMERGENCY MEDICINE
Payer: COMMERCIAL

## 2020-11-03 VITALS
TEMPERATURE: 98 F | DIASTOLIC BLOOD PRESSURE: 78 MMHG | OXYGEN SATURATION: 97 % | BODY MASS INDEX: 21.11 KG/M2 | SYSTOLIC BLOOD PRESSURE: 118 MMHG | RESPIRATION RATE: 15 BRPM | HEART RATE: 51 BPM | WEIGHT: 160 LBS

## 2020-11-03 DIAGNOSIS — R41.82 ALTERED MENTAL STATUS, UNSPECIFIED ALTERED MENTAL STATUS TYPE: Primary | ICD-10-CM

## 2020-11-03 DIAGNOSIS — T40.2X1A OPIOID OVERDOSE, ACCIDENTAL OR UNINTENTIONAL, INITIAL ENCOUNTER (HCC): ICD-10-CM

## 2020-11-03 LAB
ALBUMIN SERPL-MCNC: 4 G/DL (ref 3.5–5)
ALBUMIN/GLOB SERPL: 1.4 {RATIO} (ref 1.1–2.2)
ALP SERPL-CCNC: 85 U/L (ref 45–117)
ALT SERPL-CCNC: 40 U/L (ref 12–78)
AMMONIA PLAS-SCNC: 36 UMOL/L
AMPHET UR QL SCN: NEGATIVE
ANION GAP SERPL CALC-SCNC: 4 MMOL/L (ref 5–15)
APAP SERPL-MCNC: <2 UG/ML (ref 10–30)
AST SERPL-CCNC: 29 U/L (ref 15–37)
ATRIAL RATE: 73 BPM
BARBITURATES UR QL SCN: NEGATIVE
BASOPHILS # BLD: 0 K/UL (ref 0–0.1)
BASOPHILS NFR BLD: 1 % (ref 0–1)
BENZODIAZ UR QL: NEGATIVE
BILIRUB SERPL-MCNC: 0.7 MG/DL (ref 0.2–1)
BUN SERPL-MCNC: 12 MG/DL (ref 6–20)
BUN/CREAT SERPL: 11 (ref 12–20)
CALCIUM SERPL-MCNC: 9 MG/DL (ref 8.5–10.1)
CALCULATED P AXIS, ECG09: -3 DEGREES
CALCULATED R AXIS, ECG10: 64 DEGREES
CALCULATED T AXIS, ECG11: 38 DEGREES
CANNABINOIDS UR QL SCN: NEGATIVE
CHLORIDE SERPL-SCNC: 109 MMOL/L (ref 97–108)
CO2 SERPL-SCNC: 27 MMOL/L (ref 21–32)
COCAINE UR QL SCN: NEGATIVE
CREAT SERPL-MCNC: 1.09 MG/DL (ref 0.7–1.3)
DIAGNOSIS, 93000: NORMAL
DIFFERENTIAL METHOD BLD: ABNORMAL
DRUG SCRN COMMENT,DRGCM: ABNORMAL
EOSINOPHIL # BLD: 0.2 K/UL (ref 0–0.4)
EOSINOPHIL NFR BLD: 6 % (ref 0–7)
ERYTHROCYTE [DISTWIDTH] IN BLOOD BY AUTOMATED COUNT: 12.4 % (ref 11.5–14.5)
ETHANOL SERPL-MCNC: <10 MG/DL
GLOBULIN SER CALC-MCNC: 2.9 G/DL (ref 2–4)
GLUCOSE SERPL-MCNC: 94 MG/DL (ref 65–100)
HCT VFR BLD AUTO: 34.8 % (ref 36.6–50.3)
HGB BLD-MCNC: 12.1 G/DL (ref 12.1–17)
IMM GRANULOCYTES # BLD AUTO: 0 K/UL (ref 0–0.04)
IMM GRANULOCYTES NFR BLD AUTO: 0 % (ref 0–0.5)
INR PPP: 1.1 (ref 0.9–1.1)
LYMPHOCYTES # BLD: 1.6 K/UL (ref 0.8–3.5)
LYMPHOCYTES NFR BLD: 46 % (ref 12–49)
MCH RBC QN AUTO: 34.5 PG (ref 26–34)
MCHC RBC AUTO-ENTMCNC: 34.8 G/DL (ref 30–36.5)
MCV RBC AUTO: 99.1 FL (ref 80–99)
METHADONE UR QL: NEGATIVE
MONOCYTES # BLD: 0.4 K/UL (ref 0–1)
MONOCYTES NFR BLD: 11 % (ref 5–13)
NEUTS SEG # BLD: 1.2 K/UL (ref 1.8–8)
NEUTS SEG NFR BLD: 36 % (ref 32–75)
NRBC # BLD: 0 K/UL (ref 0–0.01)
NRBC BLD-RTO: 0 PER 100 WBC
OPIATES UR QL: POSITIVE
P-R INTERVAL, ECG05: 162 MS
PCP UR QL: NEGATIVE
PLATELET # BLD AUTO: 226 K/UL (ref 150–400)
PMV BLD AUTO: 10.1 FL (ref 8.9–12.9)
POTASSIUM SERPL-SCNC: 3.9 MMOL/L (ref 3.5–5.1)
PROT SERPL-MCNC: 6.9 G/DL (ref 6.4–8.2)
PROTHROMBIN TIME: 11.5 SEC (ref 9–11.1)
Q-T INTERVAL, ECG07: 414 MS
QRS DURATION, ECG06: 90 MS
QTC CALCULATION (BEZET), ECG08: 456 MS
RBC # BLD AUTO: 3.51 M/UL (ref 4.1–5.7)
SALICYLATES SERPL-MCNC: 5.1 MG/DL (ref 2.8–20)
SODIUM SERPL-SCNC: 140 MMOL/L (ref 136–145)
TROPONIN I SERPL-MCNC: <0.05 NG/ML
VENTRICULAR RATE, ECG03: 73 BPM
WBC # BLD AUTO: 3.3 K/UL (ref 4.1–11.1)

## 2020-11-03 PROCEDURE — 80307 DRUG TEST PRSMV CHEM ANLYZR: CPT

## 2020-11-03 PROCEDURE — 74011250636 HC RX REV CODE- 250/636

## 2020-11-03 PROCEDURE — 85025 COMPLETE CBC W/AUTO DIFF WBC: CPT

## 2020-11-03 PROCEDURE — 70450 CT HEAD/BRAIN W/O DYE: CPT

## 2020-11-03 PROCEDURE — 85610 PROTHROMBIN TIME: CPT

## 2020-11-03 PROCEDURE — 74011250636 HC RX REV CODE- 250/636: Performed by: EMERGENCY MEDICINE

## 2020-11-03 PROCEDURE — 93005 ELECTROCARDIOGRAM TRACING: CPT

## 2020-11-03 PROCEDURE — 96374 THER/PROPH/DIAG INJ IV PUSH: CPT

## 2020-11-03 PROCEDURE — 84484 ASSAY OF TROPONIN QUANT: CPT

## 2020-11-03 PROCEDURE — 82140 ASSAY OF AMMONIA: CPT

## 2020-11-03 PROCEDURE — 36415 COLL VENOUS BLD VENIPUNCTURE: CPT

## 2020-11-03 PROCEDURE — 80053 COMPREHEN METABOLIC PANEL: CPT

## 2020-11-03 PROCEDURE — 99285 EMERGENCY DEPT VISIT HI MDM: CPT

## 2020-11-03 PROCEDURE — 71045 X-RAY EXAM CHEST 1 VIEW: CPT

## 2020-11-03 RX ORDER — NALOXONE HYDROCHLORIDE 1 MG/ML
INJECTION INTRAMUSCULAR; INTRAVENOUS; SUBCUTANEOUS
Status: COMPLETED
Start: 2020-11-03 | End: 2020-11-03

## 2020-11-03 RX ORDER — NALOXONE HYDROCHLORIDE 1 MG/ML
1 INJECTION INTRAMUSCULAR; INTRAVENOUS; SUBCUTANEOUS
Status: COMPLETED | OUTPATIENT
Start: 2020-11-03 | End: 2020-11-03

## 2020-11-03 RX ADMIN — NALOXONE HYDROCHLORIDE 1 MG: 1 INJECTION INTRAMUSCULAR; INTRAVENOUS; SUBCUTANEOUS at 12:30

## 2020-11-03 RX ADMIN — NALOXONE HYDROCHLORIDE 1 MG: 1 INJECTION PARENTERAL at 12:31

## 2020-11-03 RX ADMIN — NALOXONE HYDROCHLORIDE 1 MG: 1 INJECTION PARENTERAL at 12:30

## 2020-11-03 NOTE — ED NOTES
Pt unresponsive again and has pin[oint pupils. Pt given 1mg of narcan per verbal order from Dr Sarahi Perez. Pt does not respond to narcan. Pt appears sleeping in bed at this time and per monitor does not appear symptomatic. All VS are WDL. Pt pupils are still pinpoint.

## 2020-11-03 NOTE — ED PROVIDER NOTES
EMERGENCY DEPARTMENT HISTORY AND PHYSICAL EXAM      Date: 11/3/2020  Patient Name: Fariba Alberto    Please note that this dictation was completed with [employer], the computer voice recognition software. Quite often unanticipated grammatical, syntax, homophones, and other interpretive errors are inadvertently transcribed by the computer software. Please disregard these errors. Please excuse any errors that have escaped final proofreading. History of Presenting Illness     Chief Complaint   Patient presents with    Other     unresponsive       History Provided By:  EMS    HPI: Fariba Alberto, 78 y.o. male, with a past medical history significant for hepatitis C, hepatitis B presenting the emergency department with altered mental status. Patient cannot provide much history as he arrives unresponsive. EMS reported that the patient was at a convenience store and suddenly became less responsive. When they arrived they found the patient confused, but awake. Once he got to the ambulance he became unresponsive. His vital signs remained normal except for heart rate down to the 50s. Blood sugar was 100 prehospital.  They did not give any Narcan. There is no report of any drug use. On arrival to the emergency department I found the patient had pinpoint pupils, he had some agonal respirations. Narcan 1 mg was given and the patient's pupils dilated and the patient awoke. Upon waking though he was still confused unable unable to provide a coherent history of present illness or review of systems    PCP: Liliana Vasquez MD    No current facility-administered medications on file prior to encounter. Current Outpatient Medications on File Prior to Encounter   Medication Sig Dispense Refill    amLODIPine (NORVASC) 10 mg tablet Take 1 Tab by mouth daily.  For blood pressure 90 Tab 6    hydroCHLOROthiazide (MICROZIDE) 12.5 mg capsule TAKE ONE CAPSULE EVERY DAY FOR FOR BLOOD PRESSURE 90 Cap 3    atorvastatin (LIPITOR) 40 mg tablet TAKE 1 TABLET EVERY DAY FOR CHOLESTEROL 90 Tab 3    aspirin 81 mg chewable tablet Take 1 Tab by mouth daily. 30 Tab 12       Past History     Past Medical History:  Past Medical History:   Diagnosis Date    Hepatitis B     Hepatitis C     Hypercholesterolemia     Hypertension     LBP (low back pain) 9/8/2010    Nodule     left adrenal gland       Past Surgical History:  Past Surgical History:   Procedure Laterality Date    ENDOSCOPY, COLON, DIAGNOSTIC  6/2006    Dr Barron Hawkins polyp    HX APPENDECTOMY      HX COLONOSCOPY  2016    one polyp- repeat 5 years    HX COLONOSCOPY  02/2017    dr. Merlin Palomares, one polyp- repeat in 5 years       Family History:  History reviewed. No pertinent family history. Social History:  Social History     Tobacco Use    Smoking status: Current Every Day Smoker    Smokeless tobacco: Never Used    Tobacco comment: 1-2 cigars per week   Substance Use Topics    Alcohol use: Yes     Comment: occ    Drug use: No       Allergies:  No Known Allergies      Review of Systems   Review of Systems   Unable to perform ROS: Mental status change       Physical Exam   Physical Exam  Vitals signs and nursing note reviewed. Constitutional:       Appearance: He is well-developed. Comments: Elderly -American male unresponsive on EMS stretcher. Narcan administered and the patient awoke, but was confused and unable to provide a coherent history. HENT:      Head: Normocephalic and atraumatic. Eyes:      General:         Right eye: No discharge. Left eye: No discharge. Conjunctiva/sclera: Conjunctivae normal.      Comments: Pupils initially pinpoint, nonreactive, upon Narcan administration pupils dilated and became reactive   Neck:      Musculoskeletal: Normal range of motion and neck supple. Trachea: No tracheal deviation. Cardiovascular:      Rate and Rhythm: Regular rhythm. Bradycardia present. Heart sounds: Normal heart sounds.  No murmur. Pulmonary:      Effort: Pulmonary effort is normal. No respiratory distress. Breath sounds: Normal breath sounds. No wheezing or rales. Abdominal:      General: Bowel sounds are normal.      Palpations: Abdomen is soft. Tenderness: There is no abdominal tenderness. There is no guarding or rebound. Musculoskeletal: Normal range of motion. General: No tenderness or deformity. Skin:     General: Skin is warm and dry. Findings: No erythema or rash. Neurological:      Comments: Initially the patient was obtunded, but after Narcan administration he awoke, but was very confused. Second dose of Narcan was eventually given. Upon waking though initially he is exam revealed a male who was oriented only to self, but not place or situation. He had no focal deficits. Psychiatric:      Comments: Denies SI or HI         Diagnostic Study Results     Labs -   No results found for this or any previous visit (from the past 12 hour(s)). Radiologic Studies -   XR CHEST PORT   Final Result   IMPRESSION:       No acute process. CT HEAD WO CONT   Final Result   IMPRESSION:  No significant abnormalities. CT Results  (Last 48 hours)               11/03/20 1247  CT HEAD WO CONT Final result    Impression:  IMPRESSION:  No significant abnormalities. Narrative:  EXAMINATION:  CT HEAD WO CONT       CLINICAL INFORMATION:  Altered mental status   COMPARISON:  None. TECHNIQUE: Routine axial head CT was performed. IV contrast was not   administered. Sagittal and coronal reconstructions were generated. CT dose reduction was achieved through use of a standardized protocol tailored   for this examination and automatic exposure control for dose modulation. FINDINGS:   No acute infarct, hemorrhage or mass. VENTRICULAR SYSTEM:  Normal for age. BASAL CISTERNS:  Patent. BRAIN PARENCHYMA:  No significant abnormalities. MIDLINE SHIFT:  None.    CALVARIUM/ SKULL BASE: Intact. PARANASAL SINUSES AND MASTOID AIR CELLS: Clear. VISUALIZED ORBITS: No significant abnormalities. SELLA: No enlargement. CXR Results  (Last 48 hours)               11/03/20 1314  XR CHEST PORT Final result    Impression:  IMPRESSION:        No acute process. Narrative:  EXAM:  XR CHEST PORT       INDICATION:  Altered mental status       COMPARISON:  1/2/2013       FINDINGS:       A portable AP radiograph of the chest was obtained at 12:54 hours. The patient   is on a cardiac monitor. The lungs are clear. The cardiac and mediastinal   contours and pulmonary vascularity are normal.  The bones and soft tissues are   unremarkable. There has been no change since the prior study. Medical Decision Making   I am the first provider for this patient. I reviewed the vital signs, available nursing notes, past medical history, past surgical history, family history and social history. Vital Signs-Reviewed the patient's vital signs. No data found. Records Reviewed:   Nursing notes, Prior visits     Provider Notes (Medical Decision Making):   Patient arriving in the ED with altered mental status, he was obtunded. Concern for significant decompensation, highest concern was likely opiate overdose. Patient awoken with Narcan raising suspicions of opiate overdose. Given that patient remained confused a CT of the head was obtained. This was negative. No evidence of intracranial bleed. Labs are reassuring, ammonia not significantly elevated. ED Course:   Initial assessment performed. The patients presenting problems have been discussed, and they are in agreement with the care plan formulated and outlined with them. I have encouraged them to ask questions as they arise throughout their visit.     ED Course as of Nov 04 2012   Tue Nov 03, 2020   1147 Patient immediately awakened after Narcan administration, pupils went from pinpoint to 5 mm reactive. Patient is awake and conversant now. He is denying any drug ingestions    [AR]   1217 Pupils pinpoint again, patient less responsive, will give another dose of Narcan. May need to consider Narcan drip. Vital signs remained stable    [AR]      ED Course User Index  [AR] Althea Lombard,        Prior to discharge the patient became awake, alert conversant. His UDS was positive for opiates. He was alert and oriented x3. He was refusing hospitalization. He was discharged to home in the company of his wife        Critical Care Time:   none    Disposition:    DISCHARGE NOTE  Patients results have been reviewed with them. Patient and/or family have verbally conveyed their understanding and agreement of the patient's signs, symptoms, diagnosis, treatment and prognosis and additionally agree to follow up as recommended or return to the Emergency Room should their condition change or have any new concerns prior to their follow-up appointment. Patient verbally agrees with the care-plan and verbally conveys that all of their questions have been answered. Discharge instructions have also been provided to the patient with some educational information regarding their diagnosis as well a list of reasons why they would want to return to the ER prior to their follow-up appointment should their condition change. PLAN:  1. Discharge Medication List as of 11/3/2020  2:34 PM        2. Follow-up Information     Follow up With Specialties Details Why Contact Info    [facility] EMERGENCY DEPT Emergency Medicine  If symptoms worsen [address] [place] [address]  [phone]    Cher Jaramillo MD Family Medicine Schedule an appointment as soon as possible for a visit  [address] [place] [id number]  [phone]            Return to ED if worse     Diagnosis     Clinical Impression:   1. Altered mental status, unspecified altered mental status type    2.  Opioid overdose, accidental or unintentional, initial encounter [facility])        Attestations:   This note was completed by Alyssa Keys DO

## 2020-11-03 NOTE — ED NOTES
Pt arrives via squad unresponsive. Per EMS report:  Pt walked into a store and fell over. Pt was unresponsive on squad arrival. EMS reports BG at 100. Pt pupils are pinpoint on arrival. Pt hr <50. Pt given 1mg narcan per Dr Muniz Asp verbal order and Pt woke up. Pt is responsive to questions and denies using any drugs.

## 2020-11-03 NOTE — ED NOTES
LEA Palmer reviewed discharge instructions with the patient. The patient verbalized understanding. All questions and concerns were addressed. The patient declined a wheelchair and is discharged ambulatory in the care of family members with instructions and prescriptions in hand. Pt is alert and oriented x 4. Respirations are clear and unlabored.

## 2021-01-12 ENCOUNTER — OFFICE VISIT (OUTPATIENT)
Dept: FAMILY MEDICINE CLINIC | Age: 71
End: 2021-01-12
Payer: MEDICARE

## 2021-01-12 VITALS
HEIGHT: 73 IN | DIASTOLIC BLOOD PRESSURE: 62 MMHG | RESPIRATION RATE: 16 BRPM | OXYGEN SATURATION: 100 % | SYSTOLIC BLOOD PRESSURE: 147 MMHG | WEIGHT: 157 LBS | BODY MASS INDEX: 20.81 KG/M2 | TEMPERATURE: 97.3 F | HEART RATE: 65 BPM

## 2021-01-12 DIAGNOSIS — E78.00 HYPERCHOLESTEROLEMIA: ICD-10-CM

## 2021-01-12 DIAGNOSIS — Z00.00 ENCOUNTER FOR MEDICARE ANNUAL WELLNESS EXAM: Primary | ICD-10-CM

## 2021-01-12 DIAGNOSIS — R39.12 WEAK URINARY STREAM: ICD-10-CM

## 2021-01-12 PROCEDURE — G8753 SYS BP > OR = 140: HCPCS | Performed by: FAMILY MEDICINE

## 2021-01-12 PROCEDURE — G8754 DIAS BP LESS 90: HCPCS | Performed by: FAMILY MEDICINE

## 2021-01-12 PROCEDURE — 3017F COLORECTAL CA SCREEN DOC REV: CPT | Performed by: FAMILY MEDICINE

## 2021-01-12 PROCEDURE — 1101F PT FALLS ASSESS-DOCD LE1/YR: CPT | Performed by: FAMILY MEDICINE

## 2021-01-12 PROCEDURE — G8510 SCR DEP NEG, NO PLAN REQD: HCPCS | Performed by: FAMILY MEDICINE

## 2021-01-12 PROCEDURE — G0463 HOSPITAL OUTPT CLINIC VISIT: HCPCS | Performed by: FAMILY MEDICINE

## 2021-01-12 PROCEDURE — 99213 OFFICE O/P EST LOW 20 MIN: CPT | Performed by: FAMILY MEDICINE

## 2021-01-12 PROCEDURE — G8427 DOCREV CUR MEDS BY ELIG CLIN: HCPCS | Performed by: FAMILY MEDICINE

## 2021-01-12 PROCEDURE — G8420 CALC BMI NORM PARAMETERS: HCPCS | Performed by: FAMILY MEDICINE

## 2021-01-12 PROCEDURE — G8536 NO DOC ELDER MAL SCRN: HCPCS | Performed by: FAMILY MEDICINE

## 2021-01-12 PROCEDURE — G0439 PPPS, SUBSEQ VISIT: HCPCS | Performed by: FAMILY MEDICINE

## 2021-01-12 NOTE — PROGRESS NOTES
Chief Complaint   Patient presents with   Zeke Lidia Annual Wellness Visit     1. Have you been to the ER, urgent care clinic since your last visit? Hospitalized since your last visit? No    2. Have you seen or consulted any other health care providers outside of the [address] [place] [address] since your last visit? Include any pap smears or colon screening.  No

## 2021-01-12 NOTE — PROGRESS NOTES
HISTORY OF PRESENT ILLNESS  Antoni Saha is a 78 y.o. male. HPI Pt. Comes in for blood pressure and cholesterol check. No complaints of chest pain, shortness of breath, TIAs, claudication or edema. Needs medicare wellness exam.   Sees Dr. Rhianna Pierce (hepatology). UTD on colonoscopy. Declines flu or pneumonia vaccines, however, is open to the idea of Covid vaccine. Would want wife Ousmane Godwin to be his mPOA. Review of Systems   HENT: Negative for hearing loss. Cardiovascular: Negative for chest pain. Neurological:        No falls, canes. Independent in all adls. Memory ijntact. Psychiatric/Behavioral: Negative for depression. Very rare beer. Physical Exam  Vitals signs and nursing note reviewed. Constitutional:       Appearance: He is well-developed. HENT:      Right Ear: External ear normal.      Left Ear: External ear normal.   Neck:      Thyroid: No thyromegaly. Cardiovascular:      Rate and Rhythm: Normal rate and regular rhythm. Heart sounds: Normal heart sounds. Pulmonary:      Effort: Pulmonary effort is normal. No respiratory distress. Breath sounds: Normal breath sounds. No wheezing. Abdominal:      General: Bowel sounds are normal. There is no distension. Palpations: Abdomen is soft. There is no mass. Tenderness: There is no abdominal tenderness. There is no guarding. Musculoskeletal: Normal range of motion. Lymphadenopathy:      Cervical: No cervical adenopathy. ASSESSMENT and PLAN  Orders Placed This Encounter    PROSTATE SPECIFIC AG    LIPID PANEL     Diagnoses and all orders for this visit:    1. Encounter for Medicare annual wellness exam    2. Weak urinary stream  -     PSA, DIAGNOSTIC (PROSTATE SPECIFIC AG); Future    3. Hypercholesterolemia  -     LIPID PANEL; Future      Follow-up and Dispositions    · Return in about 6 months (around 7/12/2021).

## 2021-01-13 LAB
CHOLEST SERPL-MCNC: 149 MG/DL (ref 100–199)
HDLC SERPL-MCNC: 84 MG/DL
INTERPRETATION, 910389: NORMAL
LDLC SERPL CALC-MCNC: 54 MG/DL (ref 0–99)
PSA SERPL-MCNC: 0.2 NG/ML (ref 0–4)
TRIGL SERPL-MCNC: 53 MG/DL (ref 0–149)
VLDLC SERPL CALC-MCNC: 11 MG/DL (ref 5–40)

## 2021-05-05 DIAGNOSIS — Z00.00 ROUTINE GENERAL MEDICAL EXAMINATION AT A HEALTH CARE FACILITY: ICD-10-CM

## 2021-05-05 RX ORDER — ATORVASTATIN CALCIUM 40 MG/1
TABLET, FILM COATED ORAL
Qty: 90 TAB | Refills: 3 | Status: SHIPPED | OUTPATIENT
Start: 2021-05-05 | End: 2022-02-27

## 2021-08-04 DIAGNOSIS — Z00.00 ROUTINE GENERAL MEDICAL EXAMINATION AT A HEALTH CARE FACILITY: ICD-10-CM

## 2021-08-04 RX ORDER — HYDROCHLOROTHIAZIDE 12.5 MG/1
CAPSULE ORAL
Qty: 90 CAPSULE | Refills: 3 | Status: SHIPPED | OUTPATIENT
Start: 2021-08-04 | End: 2022-08-04

## 2021-08-05 RX ORDER — AMLODIPINE BESYLATE 10 MG/1
TABLET ORAL
Qty: 90 TABLET | Refills: 6 | Status: SHIPPED | OUTPATIENT
Start: 2021-08-05 | End: 2022-11-03

## 2021-09-07 ENCOUNTER — OFFICE VISIT (OUTPATIENT)
Dept: FAMILY MEDICINE CLINIC | Age: 71
End: 2021-09-07
Payer: COMMERCIAL

## 2021-09-07 VITALS
OXYGEN SATURATION: 99 % | SYSTOLIC BLOOD PRESSURE: 132 MMHG | DIASTOLIC BLOOD PRESSURE: 65 MMHG | RESPIRATION RATE: 16 BRPM | BODY MASS INDEX: 20.57 KG/M2 | HEART RATE: 72 BPM | HEIGHT: 73 IN | TEMPERATURE: 97.2 F | WEIGHT: 155.2 LBS

## 2021-09-07 DIAGNOSIS — M54.6 ACUTE RIGHT-SIDED THORACIC BACK PAIN: Primary | ICD-10-CM

## 2021-09-07 DIAGNOSIS — E78.00 HYPERCHOLESTEROLEMIA: ICD-10-CM

## 2021-09-07 DIAGNOSIS — I10 ESSENTIAL HYPERTENSION: ICD-10-CM

## 2021-09-07 LAB
APPEARANCE UR: CLEAR
BACTERIA URNS QL MICRO: NEGATIVE /HPF
BILIRUB UR QL: NEGATIVE
COLOR UR: ABNORMAL
EPITH CASTS URNS QL MICRO: ABNORMAL /LPF
GLUCOSE UR STRIP.AUTO-MCNC: NEGATIVE MG/DL
HGB UR QL STRIP: ABNORMAL
HYALINE CASTS URNS QL MICRO: ABNORMAL /LPF (ref 0–5)
KETONES UR QL STRIP.AUTO: NEGATIVE MG/DL
LEUKOCYTE ESTERASE UR QL STRIP.AUTO: NEGATIVE
NITRITE UR QL STRIP.AUTO: NEGATIVE
PH UR STRIP: 5 [PH] (ref 5–8)
PROT UR STRIP-MCNC: NEGATIVE MG/DL
RBC #/AREA URNS HPF: ABNORMAL /HPF (ref 0–5)
SP GR UR REFRACTOMETRY: 1.02 (ref 1–1.03)
UROBILINOGEN UR QL STRIP.AUTO: 0.2 EU/DL (ref 0.2–1)
WBC URNS QL MICRO: ABNORMAL /HPF (ref 0–4)

## 2021-09-07 PROCEDURE — G8752 SYS BP LESS 140: HCPCS | Performed by: FAMILY MEDICINE

## 2021-09-07 PROCEDURE — G8536 NO DOC ELDER MAL SCRN: HCPCS | Performed by: FAMILY MEDICINE

## 2021-09-07 PROCEDURE — G8420 CALC BMI NORM PARAMETERS: HCPCS | Performed by: FAMILY MEDICINE

## 2021-09-07 PROCEDURE — G8510 SCR DEP NEG, NO PLAN REQD: HCPCS | Performed by: FAMILY MEDICINE

## 2021-09-07 PROCEDURE — 1101F PT FALLS ASSESS-DOCD LE1/YR: CPT | Performed by: FAMILY MEDICINE

## 2021-09-07 PROCEDURE — G8427 DOCREV CUR MEDS BY ELIG CLIN: HCPCS | Performed by: FAMILY MEDICINE

## 2021-09-07 PROCEDURE — 3017F COLORECTAL CA SCREEN DOC REV: CPT | Performed by: FAMILY MEDICINE

## 2021-09-07 PROCEDURE — G0463 HOSPITAL OUTPT CLINIC VISIT: HCPCS | Performed by: FAMILY MEDICINE

## 2021-09-07 PROCEDURE — G8754 DIAS BP LESS 90: HCPCS | Performed by: FAMILY MEDICINE

## 2021-09-07 PROCEDURE — 99213 OFFICE O/P EST LOW 20 MIN: CPT | Performed by: FAMILY MEDICINE

## 2021-09-07 NOTE — PROGRESS NOTES
HISTORY OF PRESENT ILLNESS  Zion Garcia is a 73 y.o. male. HPI Had a severe R sided posterior thoracic back pain one week ago, pain was 4/10. No aggravating factors, wasn't related to eating. After having a BM, seemed to get better. No nausea, vomiting, fever, chills, cough, dyspnea. Pain was fairly steady for a few days. Didn't take any meds for it. ROS    Physical Exam  Vitals and nursing note reviewed. Constitutional:       Appearance: He is well-developed. HENT:      Right Ear: External ear normal.      Left Ear: External ear normal.   Neck:      Thyroid: No thyromegaly. Cardiovascular:      Rate and Rhythm: Normal rate and regular rhythm. Heart sounds: Normal heart sounds. Pulmonary:      Effort: Pulmonary effort is normal. No respiratory distress. Breath sounds: Normal breath sounds. No wheezing. Abdominal:      General: Bowel sounds are normal. There is no distension. Palpations: Abdomen is soft. There is no mass. Tenderness: There is no abdominal tenderness. There is no guarding. Musculoskeletal:         General: Normal range of motion. Comments: BAck- without tenderness   Lymphadenopathy:      Cervical: No cervical adenopathy. ASSESSMENT and PLAN  Orders Placed This Encounter    XR CHEST PA LAT    CBC WITH AUTOMATED DIFF    METABOLIC PANEL, COMPREHENSIVE    LIPID PANEL    AMB POC URINALYSIS DIP STICK AUTO W/O MICRO     Diagnoses and all orders for this visit:    1. Acute right-sided thoracic back pain  -     XR CHEST PA LAT; Future  -     AMB POC URINALYSIS DIP STICK AUTO W/O MICRO    2. Hypercholesterolemia  -     LIPID PANEL; Future    3. Essential hypertension  -     CBC WITH AUTOMATED DIFF; Future  -     METABOLIC PANEL, COMPREHENSIVE; Future          Most likely passed a kidney stone. Return if symptoms recur.

## 2021-09-07 NOTE — PROGRESS NOTES
Chief Complaint   Patient presents with    Back Pain     1. Have you been to the ER, urgent care clinic since your last visit? Hospitalized since your last visit? No    2. Have you seen or consulted any other health care providers outside of the [facility] since your last visit? Include any pap smears or colon screening. No     Patient noticed back pain on left side of spine about two weeks ago. Does not hurt at all today and per patient he noticed it stopped after he had a bowel movement.

## 2021-09-08 LAB
ALBUMIN SERPL-MCNC: 4.5 G/DL (ref 3.5–5)
ALBUMIN/GLOB SERPL: 1.5 {RATIO} (ref 1.1–2.2)
ALP SERPL-CCNC: 87 U/L (ref 45–117)
ALT SERPL-CCNC: 49 U/L (ref 12–78)
ANION GAP SERPL CALC-SCNC: 5 MMOL/L (ref 5–15)
AST SERPL-CCNC: 34 U/L (ref 15–37)
BASOPHILS # BLD: 0 K/UL (ref 0–0.1)
BASOPHILS NFR BLD: 1 % (ref 0–1)
BILIRUB SERPL-MCNC: 0.5 MG/DL (ref 0.2–1)
BUN SERPL-MCNC: 13 MG/DL (ref 6–20)
BUN/CREAT SERPL: 12 (ref 12–20)
CALCIUM SERPL-MCNC: 9.1 MG/DL (ref 8.5–10.1)
CHLORIDE SERPL-SCNC: 108 MMOL/L (ref 97–108)
CHOLEST SERPL-MCNC: 177 MG/DL
CO2 SERPL-SCNC: 27 MMOL/L (ref 21–32)
CREAT SERPL-MCNC: 1.06 MG/DL (ref 0.7–1.3)
DIFFERENTIAL METHOD BLD: ABNORMAL
EOSINOPHIL # BLD: 0.3 K/UL (ref 0–0.4)
EOSINOPHIL NFR BLD: 4 % (ref 0–7)
ERYTHROCYTE [DISTWIDTH] IN BLOOD BY AUTOMATED COUNT: 13 % (ref 11.5–14.5)
GLOBULIN SER CALC-MCNC: 3.1 G/DL (ref 2–4)
GLUCOSE SERPL-MCNC: 93 MG/DL (ref 65–100)
HCT VFR BLD AUTO: 40.6 % (ref 36.6–50.3)
HDLC SERPL-MCNC: 91 MG/DL
HDLC SERPL: 1.9 {RATIO} (ref 0–5)
HGB BLD-MCNC: 13.6 G/DL (ref 12.1–17)
IMM GRANULOCYTES # BLD AUTO: 0 K/UL (ref 0–0.04)
IMM GRANULOCYTES NFR BLD AUTO: 0 % (ref 0–0.5)
LDLC SERPL CALC-MCNC: 74 MG/DL (ref 0–100)
LYMPHOCYTES # BLD: 1.9 K/UL (ref 0.8–3.5)
LYMPHOCYTES NFR BLD: 33 % (ref 12–49)
MCH RBC QN AUTO: 34.2 PG (ref 26–34)
MCHC RBC AUTO-ENTMCNC: 33.5 G/DL (ref 30–36.5)
MCV RBC AUTO: 102 FL (ref 80–99)
MONOCYTES # BLD: 0.5 K/UL (ref 0–1)
MONOCYTES NFR BLD: 9 % (ref 5–13)
NEUTS SEG # BLD: 3 K/UL (ref 1.8–8)
NEUTS SEG NFR BLD: 53 % (ref 32–75)
NRBC # BLD: 0 K/UL (ref 0–0.01)
NRBC BLD-RTO: 0 PER 100 WBC
PLATELET # BLD AUTO: 244 K/UL (ref 150–400)
PMV BLD AUTO: 10.3 FL (ref 8.9–12.9)
POTASSIUM SERPL-SCNC: 4.3 MMOL/L (ref 3.5–5.1)
PROT SERPL-MCNC: 7.6 G/DL (ref 6.4–8.2)
RBC # BLD AUTO: 3.98 M/UL (ref 4.1–5.7)
SODIUM SERPL-SCNC: 140 MMOL/L (ref 136–145)
TRIGL SERPL-MCNC: 60 MG/DL (ref ?–150)
VLDLC SERPL CALC-MCNC: 12 MG/DL
WBC # BLD AUTO: 5.8 K/UL (ref 4.1–11.1)

## 2022-02-25 ENCOUNTER — HOSPITAL ENCOUNTER (OUTPATIENT)
Dept: PREADMISSION TESTING | Age: 72
Discharge: HOME | End: 2022-02-25
Attending: INTERNAL MEDICINE
Payer: COMMERCIAL

## 2022-02-25 ENCOUNTER — TRANSCRIBE ORDER (OUTPATIENT)
Dept: REGISTRATION | Age: 72
End: 2022-02-25

## 2022-02-25 DIAGNOSIS — Z00.00 ROUTINE GENERAL MEDICAL EXAMINATION AT A HEALTH CARE FACILITY: ICD-10-CM

## 2022-02-25 DIAGNOSIS — U07.1 COVID-19: ICD-10-CM

## 2022-02-25 DIAGNOSIS — U07.1 COVID-19: Primary | ICD-10-CM

## 2022-02-25 PROCEDURE — U0005 INFEC AGEN DETEC AMPLI PROBE: HCPCS

## 2022-02-26 LAB
SARS-COV-2, XPLCVT: NOT DETECTED
SOURCE, COVRS: NORMAL

## 2022-02-27 RX ORDER — ATORVASTATIN CALCIUM 40 MG/1
TABLET, FILM COATED ORAL
Qty: 90 TABLET | Refills: 3 | Status: SHIPPED | OUTPATIENT
Start: 2022-02-27

## 2022-03-01 ENCOUNTER — ANESTHESIA (OUTPATIENT)
Dept: ENDOSCOPY | Age: 72
End: 2022-03-01
Payer: COMMERCIAL

## 2022-03-01 ENCOUNTER — HOSPITAL ENCOUNTER (OUTPATIENT)
Age: 72
Setting detail: OUTPATIENT SURGERY
Discharge: HOME | End: 2022-03-01
Attending: INTERNAL MEDICINE | Admitting: INTERNAL MEDICINE
Payer: COMMERCIAL

## 2022-03-01 ENCOUNTER — ANESTHESIA EVENT (OUTPATIENT)
Dept: ENDOSCOPY | Age: 72
End: 2022-03-01
Payer: COMMERCIAL

## 2022-03-01 VITALS
RESPIRATION RATE: 13 BRPM | DIASTOLIC BLOOD PRESSURE: 92 MMHG | HEART RATE: 56 BPM | BODY MASS INDEX: 19.88 KG/M2 | SYSTOLIC BLOOD PRESSURE: 153 MMHG | OXYGEN SATURATION: 100 % | TEMPERATURE: 98.4 F | WEIGHT: 150 LBS | HEIGHT: 73 IN

## 2022-03-01 PROCEDURE — 2709999900 HC NON-CHARGEABLE SUPPLY: Performed by: INTERNAL MEDICINE

## 2022-03-01 PROCEDURE — 77030029384 HC SNR POLYP CAPTVR II BSC -B: Performed by: INTERNAL MEDICINE

## 2022-03-01 PROCEDURE — 74011250636 HC RX REV CODE- 250/636: Performed by: NURSE ANESTHETIST, CERTIFIED REGISTERED

## 2022-03-01 PROCEDURE — 88305 TISSUE EXAM BY PATHOLOGIST: CPT

## 2022-03-01 PROCEDURE — 76060000031 HC ANESTHESIA FIRST 0.5 HR: Performed by: INTERNAL MEDICINE

## 2022-03-01 PROCEDURE — 76040000019: Performed by: INTERNAL MEDICINE

## 2022-03-01 PROCEDURE — 74011000250 HC RX REV CODE- 250: Performed by: NURSE ANESTHETIST, CERTIFIED REGISTERED

## 2022-03-01 RX ORDER — NALOXONE HYDROCHLORIDE 0.4 MG/ML
0.4 INJECTION, SOLUTION INTRAMUSCULAR; INTRAVENOUS; SUBCUTANEOUS
Status: DISCONTINUED | OUTPATIENT
Start: 2022-03-01 | End: 2022-03-01 | Stop reason: HOSPADM

## 2022-03-01 RX ORDER — ATROPINE SULFATE 0.1 MG/ML
0.5 INJECTION INTRAVENOUS
Status: DISCONTINUED | OUTPATIENT
Start: 2022-03-01 | End: 2022-03-01 | Stop reason: HOSPADM

## 2022-03-01 RX ORDER — EPINEPHRINE 0.1 MG/ML
1 INJECTION INTRACARDIAC; INTRAVENOUS
Status: DISCONTINUED | OUTPATIENT
Start: 2022-03-01 | End: 2022-03-01 | Stop reason: HOSPADM

## 2022-03-01 RX ORDER — FENTANYL CITRATE 50 UG/ML
12.5-2 INJECTION, SOLUTION INTRAMUSCULAR; INTRAVENOUS
Status: DISCONTINUED | OUTPATIENT
Start: 2022-03-01 | End: 2022-03-01 | Stop reason: HOSPADM

## 2022-03-01 RX ORDER — SODIUM CHLORIDE 9 MG/ML
75 INJECTION, SOLUTION INTRAVENOUS CONTINUOUS
Status: DISCONTINUED | OUTPATIENT
Start: 2022-03-01 | End: 2022-03-01 | Stop reason: HOSPADM

## 2022-03-01 RX ORDER — SODIUM CHLORIDE 0.9 % (FLUSH) 0.9 %
5-40 SYRINGE (ML) INJECTION AS NEEDED
Status: DISCONTINUED | OUTPATIENT
Start: 2022-03-01 | End: 2022-03-01 | Stop reason: HOSPADM

## 2022-03-01 RX ORDER — SODIUM CHLORIDE 9 MG/ML
INJECTION, SOLUTION INTRAVENOUS
Status: DISCONTINUED | OUTPATIENT
Start: 2022-03-01 | End: 2022-03-01 | Stop reason: HOSPADM

## 2022-03-01 RX ORDER — PROPOFOL 10 MG/ML
INJECTION, EMULSION INTRAVENOUS AS NEEDED
Status: DISCONTINUED | OUTPATIENT
Start: 2022-03-01 | End: 2022-03-01 | Stop reason: HOSPADM

## 2022-03-01 RX ORDER — DEXTROMETHORPHAN/PSEUDOEPHED 2.5-7.5/.8
1.2 DROPS ORAL
Status: DISCONTINUED | OUTPATIENT
Start: 2022-03-01 | End: 2022-03-01 | Stop reason: HOSPADM

## 2022-03-01 RX ORDER — FLUMAZENIL 0.1 MG/ML
0.2 INJECTION INTRAVENOUS
Status: DISCONTINUED | OUTPATIENT
Start: 2022-03-01 | End: 2022-03-01 | Stop reason: HOSPADM

## 2022-03-01 RX ORDER — MIDAZOLAM HYDROCHLORIDE 1 MG/ML
.25-5 INJECTION, SOLUTION INTRAMUSCULAR; INTRAVENOUS
Status: DISCONTINUED | OUTPATIENT
Start: 2022-03-01 | End: 2022-03-01 | Stop reason: HOSPADM

## 2022-03-01 RX ORDER — SODIUM CHLORIDE 0.9 % (FLUSH) 0.9 %
5-40 SYRINGE (ML) INJECTION EVERY 8 HOURS
Status: DISCONTINUED | OUTPATIENT
Start: 2022-03-01 | End: 2022-03-01 | Stop reason: HOSPADM

## 2022-03-01 RX ORDER — LIDOCAINE HYDROCHLORIDE 20 MG/ML
INJECTION, SOLUTION EPIDURAL; INFILTRATION; INTRACAUDAL; PERINEURAL AS NEEDED
Status: DISCONTINUED | OUTPATIENT
Start: 2022-03-01 | End: 2022-03-01 | Stop reason: HOSPADM

## 2022-03-01 RX ADMIN — PROPOFOL 100 MG: 10 INJECTION, EMULSION INTRAVENOUS at 12:27

## 2022-03-01 RX ADMIN — PROPOFOL 50 MG: 10 INJECTION, EMULSION INTRAVENOUS at 12:28

## 2022-03-01 RX ADMIN — PROPOFOL 50 MG: 10 INJECTION, EMULSION INTRAVENOUS at 12:37

## 2022-03-01 RX ADMIN — PROPOFOL 50 MG: 10 INJECTION, EMULSION INTRAVENOUS at 12:39

## 2022-03-01 RX ADMIN — PROPOFOL 50 MG: 10 INJECTION, EMULSION INTRAVENOUS at 12:31

## 2022-03-01 RX ADMIN — SODIUM CHLORIDE: 900 INJECTION, SOLUTION INTRAVENOUS at 11:45

## 2022-03-01 RX ADMIN — PROPOFOL 50 MG: 10 INJECTION, EMULSION INTRAVENOUS at 12:34

## 2022-03-01 RX ADMIN — LIDOCAINE HYDROCHLORIDE 60 MG: 20 INJECTION, SOLUTION EPIDURAL; INFILTRATION; INTRACAUDAL; PERINEURAL at 12:28

## 2022-03-01 NOTE — ANESTHESIA POSTPROCEDURE EVALUATION
Procedure(s):  COLONOSCOPY   :-  ENDOSCOPIC POLYPECTOMY. MAC    Anesthesia Post Evaluation      Multimodal analgesia: multimodal analgesia used between 6 hours prior to anesthesia start to PACU discharge  Patient location during evaluation: PACU  Level of consciousness: sleepy but conscious and responsive to verbal stimuli  Pain score: 1  Pain management: adequate  Airway patency: patent  Anesthetic complications: no  Cardiovascular status: acceptable  Respiratory status: acceptable  Hydration status: acceptable  Comments: +Post-Anesthesia Evaluation and Assessment    Patient: Kanchan Mullins MRN: [id number]  SSN: [id number]   YOB: 1950  Age: 73 y.o. Sex: male      Cardiovascular Function/Vital Signs    /72   Pulse (!) 54   Temp 36.9 °C (98.4 °F)   Resp 23   Ht 6' 1\" (1.854 m)   Wt 68 kg (150 lb)   SpO2 97%   BMI 19.79 kg/m²     Patient is status post Procedure(s):  COLONOSCOPY   :-  ENDOSCOPIC POLYPECTOMY. Nausea/Vomiting: Controlled. Postoperative hydration reviewed and adequate. Pain:  Pain Scale 1: Adult Nonverbal Pain Scale (03/01/22 1248)  Pain Intensity 1: 0 (03/01/22 1248)   Managed. Neurological Status: At baseline. Mental Status and Level of Consciousness: Arousable. Pulmonary Status:   O2 Device: None (Room air) (03/01/22 1248)   Adequate oxygenation and airway patent. Complications related to anesthesia: None    Post-anesthesia assessment completed. No concerns. Signed By: Kendell Ortiz MD    3/1/2022  Post anesthesia nausea and vomiting:  controlled  Final Post Anesthesia Temperature Assessment:  Normothermia (36.0-37.5 degrees C)      INITIAL Post-op Vital signs:   Vitals Value Taken Time   /72 03/01/22 1250   Temp 36.9 °C (98.4 °F) 03/01/22 1248   Pulse 52 03/01/22 1255   Resp 21 03/01/22 1255   SpO2 98 % 03/01/22 1255   Vitals shown include unvalidated device data.

## 2022-03-01 NOTE — H&P
[address]  [phone]        History and Physical     NAME: Adelina Hamilton   :  1950   MRN:  [id number]         HPI:  Adelina Hamilton is a 73 y.o. male here for surveillance colonoscopy. Patients last colonoscopy was in 2017 when found to have a small adenomatous polyp. No family h/o colon cancer or any GI symptoms. Past Surgical History:   Procedure Laterality Date    ENDOSCOPY, COLON, DIAGNOSTIC  2006    Dr Simona Roa polyp    HX APPENDECTOMY      HX COLONOSCOPY  2016    one polyp- repeat 5 years    HX COLONOSCOPY  2017    dr. Keon Agarwal, one polyp- repeat in 5 years     Past Medical History:   Diagnosis Date    Hepatitis B     Hepatitis C     Hypercholesterolemia     Hypertension     LBP (low back pain) 2010    Nodule     left adrenal gland     Social History     Tobacco Use    Smoking status: Current Every Day Smoker    Smokeless tobacco: Never Used    Tobacco comment: 1-2 cigars per week   Substance Use Topics    Alcohol use: Yes     Comment: occ    Drug use: No     No current facility-administered medications on file prior to encounter. Current Outpatient Medications on File Prior to Encounter   Medication Sig Dispense Refill    amLODIPine (NORVASC) 10 mg tablet TAKE 1 TABLET BY MOUTH EVERY DAY FOR BLOOD PRESSURE 90 Tablet 6    hydroCHLOROthiazide (MICROZIDE) 12.5 mg capsule TAKE ONE CAPSULE EVERY DAY FOR FOR BLOOD PRESSURE 90 Capsule 3    aspirin 81 mg chewable tablet Take 1 Tab by mouth daily. 30 Tab 12     No Known Allergies  History reviewed. No pertinent family history.   Current Facility-Administered Medications   Medication Dose Route Frequency    0.9% sodium chloride infusion  75 mL/hr IntraVENous CONTINUOUS    sodium chloride (NS) flush 5-40 mL  5-40 mL IntraVENous Q8H    sodium chloride (NS) flush 5-40 mL  5-40 mL IntraVENous PRN    midazolam (VERSED) injection 0.25-5 mg  0.25-5 mg IntraVENous Multiple    fentaNYL citrate (PF) injection 12.5-200 mcg  12.5-200 mcg IntraVENous Multiple    naloxone (NARCAN) injection 0.4 mg  0.4 mg IntraVENous Multiple    flumazeniL (ROMAZICON) 0.1 mg/mL injection 0.2 mg  0.2 mg IntraVENous Multiple    simethicone (MYLICON) 54MH/6.0TF oral drops 80 mg  1.2 mL Oral Multiple    atropine injection 0.5 mg  0.5 mg IntraVENous ONCE PRN    EPINEPHrine (ADRENALIN) 0.1 mg/mL syringe 1 mg  1 mg Endoscopically ONCE PRN     Facility-Administered Medications Ordered in Other Encounters   Medication Dose Route Frequency    0.9% sodium chloride infusion   IntraVENous CONTINUOUS       PHYSICAL EXAM:    BP (!) 164/76   Pulse 76   Temp 98.4 °F (36.9 °C)   Resp 18   Ht 6' 1\" (1.854 m)   Wt 68 kg (150 lb)   SpO2 98%   BMI 19.79 kg/m²      General: WD, WN. Alert, cooperative, no acute distress    HEENT: NC, Atraumatic. PERRLA, EOMI. Anicteric sclerae. Lungs:  CTA Bilaterally. No Wheezing/Rhonchi/Rales. Heart:  Regular  rhythm,  No murmur, No Rubs, No Gallops  Abdomen: Soft, Non distended, Non tender. +Bowel sounds, no HSM  Extremities: No c/c/e  Neurologic:  CN 2-12 gi, Alert and oriented X 3. No acute neurological distress   Psych:   Good insight. Not anxious nor agitated.        Assessment:   · Personal h/o colon polyps, last colonoscopy 2017    Plan:   · Endoscopic procedure: Colonoscopy  · Anesthesia plan: Monitored Anesthesia Care

## 2022-03-01 NOTE — PROCEDURES
[address]  [phone]               Colonoscopy Operative Report      Indications:  Personal h/o colon polyps, last colonoscopy 2017    :  Asael Argueta MD    Staff: Endoscopy Technician-1: Krishna Elias  Endoscopy RN-1: Chaya Lazo RN     Referring Provider: Everrett Boeck, MD    Sedation:  MAC anesthesia    Procedure Details:  After informed consent was obtained with all risks and benefits of procedure explained and preoperative exam completed, the patient was taken to the endoscopy suite and placed in the left lateral decubitus position. Upon sequential sedation as per above, a digital rectal exam was performed  And was normal.  The Olympus videocolonoscope  was inserted in the rectum and carefully advanced to the cecum, which was identified by the ileocecal valve and appendiceal orifice. The quality of preparation was good. The colonoscope was slowly withdrawn with careful evaluation between folds. Retroflexion in the rectum was performed and was normal..     Findings:   Rectum: 1  Sessile polyp(s), the largest 6 mm in size  Grade 2 internal hemorrhoid(s); Sigmoid:     - Diverticulosis  Descending Colon: normal  Transverse Colon: normal  Ascending Colon: normal  Cecum: normal  Terminal Ileum: not intubated    Interventions:  1 complete polypectomy were performed using cold snare  and the polyps were  retrieved    Specimen Removed:   ID Type Source Tests Collected by Time Destination   1 : rectal polyp Preservative Rectum  Madina Kang MD 3/1/2022 1241 Pathology       EBL:  Minimal    Complications:  None; patient tolerated the procedure well. Impression:  -Single small (6 mm) polyp found in the rectum, removed and sent for pathology  -Mild sigmoid colon diverticulosis and small internal hemorrhoids    Recommendations:   -Resume normal medication(s).   -Begin fiber supplement daily   -High fiber diet.  -Await pathology. -Repeat colonoscopy in 5 years.  -Follow up with primary care physician as previously planned. Discharge Disposition:  Home in the company of a  when able to ambulate.     Danika Ennis MD  3/1/2022  12:44 PM

## 2022-03-01 NOTE — PERIOP NOTES

## 2022-03-01 NOTE — DISCHARGE INSTRUCTIONS
[address]  [address], [address] [facility]  [id number]  1950    COLON DISCHARGE INSTRUCTIONS    DISCOMFORT:  Redness at IV site- apply warm compress to area; if redness or soreness persist- contact your physician  There may be a slight amount of blood passed from the rectum  Gaseous discomfort- walking, belching will help relieve any discomfort    DIET:   High Fiber diet. ACTIVITY:  You may resume your normal daily activities it is recommended that you spend the remainder of the day resting -  avoid any strenuous activity. You may not operate a vehicle for 12 hours  You may not engage in an occupation involving machinery or appliances for rest of today  You may not drink alcoholic beverages for at least 12 hours  Avoid making any critical decisions for at least 24 hour    CALL M.D. ANY SIGN OF:   Increasing pain, nausea, vomiting  Abdominal distension (swelling)  New increased bleeding (oral or rectal)  Fever (chills)  Pain in chest area  Bloody discharge from nose or mouth  Shortness of breath     Follow-up Instructions:   Call Dr. Mary Montgomery for any questions or problems. Telephone # [phone]  Biopsy results will be available in  5 to 7 days    Impression:  -Single small (6 mm) polyp found in the rectum, removed and sent for pathology  -Mild sigmoid colon diverticulosis and small internal hemorrhoids    Recommendations:   -Resume normal medication(s). -Begin fiber supplement daily   -High fiber diet.  -Await pathology. -Repeat colonoscopy in 5 years.  -Follow up with primary care physician as previously planned. Mary Montgomery MD    Patient Education        High-Fiber Diet: Care Instructions  Overview     A high-fiber diet may help you relieve constipation and feel less bloated. Your doctor and dietitian will help you make a high-fiber eating plan based on your personal needs. The plan will include the things you like to eat.  It will also make sure that you get 25 to 35 grams of fiber a day. Before you make changes to the way you eat, be sure to talk with your doctor or dietitian. Follow-up care is a key part of your treatment and safety. Be sure to make and go to all appointments, and call your doctor if you are having problems. It's also a good idea to know your test results and keep a list of the medicines you take. How can you care for yourself at home? · You can increase how much fiber you get if you eat more of certain foods. These foods include:  ? Whole-grain breads and cereals. ? Fruits, such as pears, apples, and peaches. Eat the skins and peels if you can.  ? Vegetables, such as broccoli, cabbage, spinach, carrots, asparagus, and squash. ? Starchy vegetables. These include potatoes with skins, kidney beans, and lima beans. · Take a fiber supplement every day if your doctor recommends it. Examples are Benefiber, Citrucel, FiberCon, and Metamucil. Ask your doctor how much to take. · Drink plenty of fluids. If you have kidney, heart, or liver disease and have to limit fluids, talk with your doctor before you increase the amount of fluids you drink. Where can you learn more? Go to http://www.gray.com/  Enter C036 in the search box to learn more about \"High-Fiber Diet: Care Instructions. \"  Current as of: December 17, 2020               Content Version: 13.0  © 2006-2021 Healthwise, Incorporated. Care instructions adapted under license by [employer] (which disclaims liability or warranty for this information). If you have questions about a medical condition or this instruction, always ask your healthcare professional. [place] [address] any warranty or liability for your use of this information. Learning About Foods That Are Good Sources of Fiber  What foods are high in fiber? The foods you eat contain nutrients, such as vitamins and minerals.  Fiber is a nutrient. Your body needs the right amount to stay healthy and work as it should. You can use the list below to help you make choices about which foods to eat. Here are some examples of foods that are good sources of fiber. Fruits  · Apple  · Apricot  · Avocado  · Banana  · Blackberries  · Cherries  · Melon  · Pear  · Raspberries  Grains  · Amaranth  · Barley  · Bran cereal  · Farro  · Oat bran  · Oatmeal  · Quinoa  · Rice (brown or wild)  · Whole-grain bread  · Whole-grain English muffin  Protein foods  · Almonds  · Beans (black, kidney, navy, cole)  · Ivan seeds  · Garbanzo beans  · Lentils  · Pumpkin seeds  · Split peas  · Sunflower seeds  Vegetables  · Artichoke  · Broccoli  · [place] sprouts  · Cabbage  · Carrots  · Cauliflower  · Eggplant  · Green peas  · Kale  · Pumpkin  · Sweet potato  · White potato  Work with your doctor to find out how much of this nutrient you need. Depending on your health, you may need more or less of it in your diet. Where can you learn more? Go to http://www.gray.com/  Enter F355 in the search box to learn more about \"Learning About Foods That Are Good Sources of Fiber. \"  Current as of: December 17, 2020               Content Version: 13.0  © 8583-4983 [employer]. Care instructions adapted under license by [employer] (which disclaims liability or warranty for this information). If you have questions about a medical condition or this instruction, always ask your healthcare professional. [place] [address] any warranty or liability for your use of this information. Colon Polyps: Care Instructions  Your Care Instructions     Colon polyps are growths in the colon or the rectum. The cause of most colon polyps is not known, and most people who get them do not have any problems. But a certain kind can turn into cancer.  For this reason, regular testing for colon polyps is important for people as they get older. It is also important for anyone who has an increased risk for colon cancer. Polyps are usually found through routine colon cancer screening tests. Although most colon polyps are not cancerous, they are usually removed and then tested for cancer. Screening for colon cancer saves lives because the cancer can usually be cured if it is caught early. If you have a polyp that is the type that can turn into cancer, you may need more tests to examine your entire colon. The doctor will remove any other polyps that he or she finds, and you will be tested more often. Follow-up care is a key part of your treatment and safety. Be sure to make and go to all appointments, and call your doctor if you are having problems. It's also a good idea to know your test results and keep a list of the medicines you take. How can you care for yourself at home? Regular exams to look for colon polyps are the best way to prevent polyps from turning into colon cancer. These can include stool tests, sigmoidoscopy, colonoscopy, and CT colonography. Talk with your doctor about a testing schedule that is right for you. To prevent polyps  There is no home treatment that can prevent colon polyps. But these steps may help lower your risk for cancer. · Stay active. Being active can help you get to and stay at a healthy weight. Try to exercise on most days of the week. Walking is a good choice. · Eat well. Choose a variety of vegetables, fruits, legumes (such as peas and beans), fish, poultry, and whole grains. · Do not smoke. If you need help quitting, talk to your doctor about stop-smoking programs and medicines. These can increase your chances of quitting for good. · If you drink alcohol, limit how much you drink. Limit alcohol to 2 drinks a day for men and 1 drink a day for women. When should you call for help?    Call your doctor now or seek immediate medical care if:    · You have severe belly pain.     · Your stools are maroon or very bloody. Watch closely for changes in your health, and be sure to contact your doctor if:    · You have a fever.     · You have nausea or vomiting.     · You have a change in bowel habits (new constipation or diarrhea).     · Your symptoms get worse or are not improving as expected. Where can you learn more? Go to http://www.castillo.com/  Enter C571 in the search box to learn more about \"Colon Polyps: Care Instructions. \"  Current as of: December 17, 2020               Content Version: 13.0  © 0980-4134 [employer]. Care instructions adapted under license by [employer] (which disclaims liability or warranty for this information). If you have questions about a medical condition or this instruction, always ask your healthcare professional. [address] any warranty or liability for your use of this information. Hemorrhoids: Care Instructions  Overview     Hemorrhoids are swollen veins that develop in the anal canal. Bleeding during bowel movements, itching, and rectal pain are the most common symptoms. Hemorrhoids can be uncomfortable at times, but rarely are they a serious problem. Most of the time, you can treat them with simple changes to your diet and bowel habits. These changes include eating more fiber and not straining to pass stools. Most hemorrhoids don't need surgery or other treatment unless they are very large and painful or bleed a lot. Follow-up care is a key part of your treatment and safety. Be sure to make and go to all appointments, and call your doctor if you are having problems. It's also a good idea to know your test results and keep a list of the medicines you take. How can you care for yourself at home? · Sit in a few inches of warm water (sitz bath) 3 times a day and after bowel movements. The warm water helps with pain and itching.   · Put ice on your anal area several times a day for 10 minutes at a time. Put a thin cloth between the ice and your skin. Follow this by placing a warm, wet towel on the area for another 10 to 20 minutes. · Take pain medicines exactly as directed. ? If the doctor gave you a prescription medicine for pain, take it as prescribed. ? If you are not taking a prescription pain medicine, ask your doctor if you can take an over-the-counter medicine. · Keep the anal area clean, but be gentle. Use water and a fragrance-free soap, or use baby wipes or medicated pads such as Tucks. · Wear cotton underwear and loose clothing to decrease moisture in the anal area. · Eat more fiber. Include foods such as whole-grain breads and cereals, raw vegetables, raw and dried fruits, and beans. · Drink plenty of fluids. If you have kidney, heart, or liver disease and have to limit fluids, talk with your doctor before you increase the amount of fluids you drink. · Use a stool softener that contains bran or psyllium. You can save money by buying bran or psyllium (available in bulk at most health food stores) and sprinkling it on foods or stirring it into fruit juice. Or you can use a product such as Metamucil or Hydrocil. · Practice healthy bowel habits. ? Go to the bathroom as soon as you have the urge. ? Avoid straining to pass stools. Relax and give yourself time to let things happen naturally. ? Do not hold your breath while passing stools. ? Do not read while sitting on the toilet. Get off the toilet as soon as you have finished. · Take your medicines exactly as prescribed. Call your doctor if you think you are having a problem with your medicine. When should you call for help? Call 911 anytime you think you may need emergency care. For example, call if:    · You pass maroon or very bloody stools. Call your doctor now or seek immediate medical care if:    · You have increased pain.     · You have increased bleeding.    Watch closely for changes in your health, and be sure to contact your doctor if:    · Your symptoms have not improved after 3 or 4 days. Where can you learn more? Go to http://www.castillo.com/  Enter F228 in the search box to learn more about \"Hemorrhoids: Care Instructions. \"  Current as of: February 10, 2021               Content Version: 13.0  © 6791-3868 [email or web address]. Care instructions adapted under license by [employer] (which disclaims liability or warranty for this information). If you have questions about a medical condition or this instruction, always ask your healthcare professional. [address] any warranty or liability for your use of this information. Learning About Diverticulosis and Diverticulitis  What are diverticulosis and diverticulitis? In diverticulosis and diverticulitis, pouches called diverticula form in the wall of the large intestine, or colon. · In diverticulosis, the pouches do not cause any pain or other symptoms. · In diverticulitis, the pouches get inflamed or infected and cause symptoms. Doctors aren't sure what causes these pouches in the colon. But they think that a low-fiber diet may play a role. Without fiber to add bulk to the stool, the colon has to work harder than normal to push the stool forward. The pressure from this may cause pouches to form in weak spots along the colon. Some people with diverticulosis get diverticulitis. But experts don't know why this happens. What are the symptoms? · In diverticulosis, most people don't have symptoms. But pouches sometimes bleed. · In diverticulitis, symptoms may last from a few hours to a week or more. They include:  ? Belly pain. This is usually in the lower left side. It is sometimes worse when you move. This is the most common symptom. ? Fever and chills. ? Bloating and gas. ? Diarrhea or constipation. ? Nausea and sometimes vomiting.  ? Not feeling like eating. How can you prevent diverticulitis?   You may be able to lower your chance of getting diverticulitis. You can do this by taking steps to prevent constipation. · Eat fruits, vegetables, beans, and whole grains every day. These foods are high in fiber. · Drink plenty of fluids. If you have kidney, heart, or liver disease and have to limit fluids, talk with your doctor before you increase the amount of fluids you drink. · Get at least 30 minutes of exercise on most days of the week. Walking is a good choice. You also may want to do other activities, such as running, swimming, cycling, or playing tennis or team sports. · Take a fiber supplement, such as Citrucel or Metamucil, every day if needed. Read and follow all instructions on the label. · Schedule time each day for a bowel movement. Having a daily routine may help. Take your time and do not strain when having a bowel movement. Some people avoid nuts, seeds, berries, and popcorn. They believe that these foods might get trapped in the diverticula and cause pain. But there is no proof that these foods cause diverticulitis or make it worse. How are these problems treated? · The best way to treat diverticulosis is to avoid constipation. · Treatment for diverticulitis includes antibiotics. It often includes a change in your diet. You may need only liquids at first. Your doctor may suggest pain medicines for pain or belly cramps. In some cases, surgery may be needed. Follow-up care is a key part of your treatment and safety. Be sure to make and go to all appointments, and call your doctor if you are having problems. It's also a good idea to know your test results and keep a list of the medicines you take. Where can you learn more? Go to http://www.gray.com/  Enter S514 in the search box to learn more about \"Learning About Diverticulosis and Diverticulitis. \"  Current as of: February 10, 2021               Content Version: 13.0  © 7446-0290 Healthwise, [facility].    [facility] instructions adapted under license by [employer] (which disclaims liability or warranty for this information). If you have questions about a medical condition or this instruction, always ask your healthcare professional. [address] any warranty or liability for your use of this information. Learning About Coronavirus [phone])  Coronavirus [phone]): Overview  What is coronavirus (COVID-19)? The coronavirus disease (COVID-19) is caused by a virus. It is an illness that was first found in [place], [place], in December 2019. It has since spread worldwide. The virus can cause fever, cough, and trouble breathing. In severe cases, it can cause pneumonia and make it hard to breathe without help. It can cause death. Coronaviruses are a large group of viruses. They cause the common cold. They also cause more serious illnesses like Middle East respiratory syndrome (MERS) and severe acute respiratory syndrome (SARS). COVID-19 is caused by a novel coronavirus. That means it's a new type that has not been seen in people before. This virus spreads person-to-person through droplets from coughing and sneezing. It can also spread when you are close to someone who is infected. And it can spread when you touch something that has the virus on it, such as a doorknob or a tabletop. What can you do to protect yourself from coronavirus (COVID-19)? The best way to protect yourself from getting sick is to:  · Avoid areas where there is an outbreak. · Avoid contact with people who may be infected. · Wash your hands often with soap or alcohol-based hand sanitizers. · Avoid crowds and try to stay at least 6 feet away from other people. · Wash your hands often, especially after you cough or sneeze. Use soap and water, and scrub for at least 20 seconds. If soap and water aren't available, use an alcohol-based hand . · Avoid touching your mouth, nose, and eyes.   What can you do to avoid spreading the virus to others? To help avoid spreading the virus to others:  · Cover your mouth with a tissue when you cough or sneeze. Then throw the tissue in the trash. · Use a disinfectant to clean things that you touch often. · Stay home if you are sick or have been exposed to the virus. Don't go to school, work, or public areas. And don't use public transportation. · If you are sick:  ? Leave your home only if you need to get medical care. But call the doctor's office first so they know you're coming. And wear a face mask, if you have one.  ? If you have a face mask, wear it whenever you're around other people. It can help stop the spread of the virus when you cough or sneeze. ? Clean and disinfect your home every day. Use household  and disinfectant wipes or sprays. Take special care to clean things that you grab with your hands. These include doorknobs, remote controls, phones, and handles on your refrigerator and microwave. And don't forget countertops, tabletops, bathrooms, and computer keyboards. When to call for help  Call 911 anytime you think you may need emergency care. For example, call if:  · You have severe trouble breathing. (You can't talk at all.)  · You have constant chest pain or pressure. · You are severely dizzy or lightheaded. · You are confused or can't think clearly. · Your face and lips have a blue color. · You pass out (lose consciousness) or are very hard to wake up. Call your doctor now if you develop symptoms such as:  · Shortness of breath. · Fever. · Cough. If you need to get care, call ahead to the doctor's office for instructions before you go. Make sure you wear a face mask, if you have one, to prevent exposing other people to the virus. Where can you get the latest information? The following health organizations are tracking and studying this virus. Their websites contain the most up-to-date information.  Ivette Reed also learn what to do if you think you may have been exposed to the virus. · U.S. Centers for Disease Control and Prevention (CDC): The CDC provides updated news about the disease and travel advice. The website also tells you how to prevent the spread of infection. www.cdc.gov  · World Health Organization [facility]): WHO offers information about the virus outbreaks. WHO also has travel advice. www.who.int  Current as of: April 1, 2020               Content Version: 12.4  © 2006-2020 Healthwise, Incorporated. Care instructions adapted under license by your healthcare professional. If you have questions about a medical condition or this instruction, always ask your healthcare professional. [address] any warranty or liability for your use of this information.

## 2022-03-01 NOTE — ANESTHESIA PREPROCEDURE EVALUATION
Relevant Problems   CARDIOVASCULAR   (+) Essential hypertension      GASTROINTESTINAL   (+) Chronic hepatitis B (HCC)       Anesthetic History   No history of anesthetic complications            Review of Systems / Medical History  Patient summary reviewed, nursing notes reviewed and pertinent labs reviewed    Pulmonary          Smoker         Neuro/Psych   Within defined limits           Cardiovascular    Hypertension              Exercise tolerance: >4 METS     GI/Hepatic/Renal       Hepatitis: type B and C    Liver disease     Endo/Other  Within defined limits           Other Findings   Comments: Hepatitis B  Hepatitis C  Nodule  LBP (low back pain)  Hypercholesterolemia  Hypertension           Physical Exam    Airway  Mallampati: II  TM Distance: 4 - 6 cm  Neck ROM: normal range of motion   Mouth opening: Normal     Cardiovascular  Regular rate and rhythm,  S1 and S2 normal,  no murmur, click, rub, or gallop  Rhythm: regular  Rate: normal         Dental  No notable dental hx       Pulmonary  Breath sounds clear to auscultation               Abdominal  GI exam deferred       Other Findings            Anesthetic Plan    ASA: 3  Anesthesia type: MAC          Induction: Intravenous  Anesthetic plan and risks discussed with: Patient

## 2022-03-19 PROBLEM — R07.89 OTHER CHEST PAIN: Status: ACTIVE | Noted: 2018-03-06

## 2022-03-19 PROBLEM — Z86.19 HEPATITIS C VIRUS INFECTION CURED AFTER ANTIVIRAL DRUG THERAPY: Status: ACTIVE | Noted: 2019-07-09

## 2022-03-19 PROBLEM — I10 ESSENTIAL HYPERTENSION: Status: ACTIVE | Noted: 2018-03-06

## 2022-03-19 PROBLEM — E78.00 HIGH CHOLESTEROL: Status: ACTIVE | Noted: 2018-03-06

## 2022-08-04 DIAGNOSIS — Z00.00 ROUTINE GENERAL MEDICAL EXAMINATION AT A HEALTH CARE FACILITY: ICD-10-CM

## 2022-08-04 RX ORDER — HYDROCHLOROTHIAZIDE 12.5 MG/1
CAPSULE ORAL
Qty: 90 CAPSULE | Refills: 3 | Status: SHIPPED | OUTPATIENT
Start: 2022-08-04

## 2022-11-03 RX ORDER — AMLODIPINE BESYLATE 10 MG/1
TABLET ORAL
Qty: 90 TABLET | Refills: 6 | Status: SHIPPED | OUTPATIENT
Start: 2022-11-03

## 2022-12-03 ENCOUNTER — APPOINTMENT (OUTPATIENT)
Dept: CT IMAGING | Age: 72
End: 2022-12-03
Attending: EMERGENCY MEDICINE
Payer: COMMERCIAL

## 2022-12-03 ENCOUNTER — APPOINTMENT (OUTPATIENT)
Dept: GENERAL RADIOLOGY | Age: 72
End: 2022-12-03
Attending: EMERGENCY MEDICINE
Payer: COMMERCIAL

## 2022-12-03 ENCOUNTER — HOSPITAL ENCOUNTER (EMERGENCY)
Age: 72
Discharge: HOME | End: 2022-12-03
Attending: EMERGENCY MEDICINE
Payer: COMMERCIAL

## 2022-12-03 VITALS
SYSTOLIC BLOOD PRESSURE: 144 MMHG | TEMPERATURE: 98.4 F | RESPIRATION RATE: 11 BRPM | OXYGEN SATURATION: 96 % | HEIGHT: 72 IN | WEIGHT: 154.54 LBS | HEART RATE: 59 BPM | BODY MASS INDEX: 20.93 KG/M2 | DIASTOLIC BLOOD PRESSURE: 75 MMHG

## 2022-12-03 DIAGNOSIS — R55 NEAR SYNCOPE: Primary | ICD-10-CM

## 2022-12-03 LAB
ALBUMIN SERPL-MCNC: 4.4 G/DL (ref 3.5–5)
ALBUMIN/GLOB SERPL: 1.4 (ref 1.1–2.2)
ALP SERPL-CCNC: 94 U/L (ref 45–117)
ALT SERPL-CCNC: 37 U/L (ref 12–78)
ANION GAP SERPL CALC-SCNC: 5 MMOL/L (ref 5–15)
AST SERPL-CCNC: 28 U/L (ref 15–37)
ATRIAL RATE: 57 BPM
BASOPHILS # BLD: 0.1 K/UL (ref 0–0.1)
BASOPHILS NFR BLD: 1 % (ref 0–1)
BILIRUB SERPL-MCNC: 0.6 MG/DL (ref 0.2–1)
BNP SERPL-MCNC: 166 PG/ML
BUN SERPL-MCNC: 13 MG/DL (ref 6–20)
BUN/CREAT SERPL: 11 (ref 12–20)
CALCIUM SERPL-MCNC: 9.6 MG/DL (ref 8.5–10.1)
CALCULATED P AXIS, ECG09: -24 DEGREES
CALCULATED R AXIS, ECG10: 70 DEGREES
CALCULATED T AXIS, ECG11: 61 DEGREES
CHLORIDE SERPL-SCNC: 103 MMOL/L (ref 97–108)
CO2 SERPL-SCNC: 30 MMOL/L (ref 21–32)
CREAT SERPL-MCNC: 1.22 MG/DL (ref 0.7–1.3)
DIAGNOSIS, 93000: NORMAL
DIFFERENTIAL METHOD BLD: ABNORMAL
EOSINOPHIL # BLD: 0.3 K/UL (ref 0–0.4)
EOSINOPHIL NFR BLD: 5 % (ref 0–7)
ERYTHROCYTE [DISTWIDTH] IN BLOOD BY AUTOMATED COUNT: 12.7 % (ref 11.5–14.5)
GLOBULIN SER CALC-MCNC: 3.1 G/DL (ref 2–4)
GLUCOSE SERPL-MCNC: 96 MG/DL (ref 65–100)
HCT VFR BLD AUTO: 35.7 % (ref 36.6–50.3)
HGB BLD-MCNC: 12.2 G/DL (ref 12.1–17)
IMM GRANULOCYTES # BLD AUTO: 0 K/UL (ref 0–0.04)
IMM GRANULOCYTES NFR BLD AUTO: 0 % (ref 0–0.5)
LYMPHOCYTES # BLD: 2.5 K/UL (ref 0.8–3.5)
LYMPHOCYTES NFR BLD: 43 % (ref 12–49)
MCH RBC QN AUTO: 34.4 PG (ref 26–34)
MCHC RBC AUTO-ENTMCNC: 34.2 G/DL (ref 30–36.5)
MCV RBC AUTO: 100.6 FL (ref 80–99)
MONOCYTES # BLD: 0.6 K/UL (ref 0–1)
MONOCYTES NFR BLD: 11 % (ref 5–13)
NEUTS SEG # BLD: 2.4 K/UL (ref 1.8–8)
NEUTS SEG NFR BLD: 40 % (ref 32–75)
NRBC # BLD: 0 K/UL (ref 0–0.01)
NRBC BLD-RTO: 0 PER 100 WBC
P-R INTERVAL, ECG05: 164 MS
PLATELET # BLD AUTO: 222 K/UL (ref 150–400)
PMV BLD AUTO: 9.1 FL (ref 8.9–12.9)
POTASSIUM SERPL-SCNC: 3.8 MMOL/L (ref 3.5–5.1)
PROT SERPL-MCNC: 7.5 G/DL (ref 6.4–8.2)
Q-T INTERVAL, ECG07: 472 MS
QRS DURATION, ECG06: 94 MS
QTC CALCULATION (BEZET), ECG08: 459 MS
RBC # BLD AUTO: 3.55 M/UL (ref 4.1–5.7)
SODIUM SERPL-SCNC: 138 MMOL/L (ref 136–145)
TROPONIN-HIGH SENSITIVITY: 6 NG/L (ref 0–76)
TROPONIN-HIGH SENSITIVITY: 6 NG/L (ref 0–76)
VENTRICULAR RATE, ECG03: 57 BPM
WBC # BLD AUTO: 5.9 K/UL (ref 4.1–11.1)

## 2022-12-03 PROCEDURE — 96360 HYDRATION IV INFUSION INIT: CPT

## 2022-12-03 PROCEDURE — 99285 EMERGENCY DEPT VISIT HI MDM: CPT

## 2022-12-03 PROCEDURE — 96361 HYDRATE IV INFUSION ADD-ON: CPT

## 2022-12-03 PROCEDURE — 93005 ELECTROCARDIOGRAM TRACING: CPT

## 2022-12-03 PROCEDURE — 83880 ASSAY OF NATRIURETIC PEPTIDE: CPT

## 2022-12-03 PROCEDURE — 74011250636 HC RX REV CODE- 250/636: Performed by: EMERGENCY MEDICINE

## 2022-12-03 PROCEDURE — 80053 COMPREHEN METABOLIC PANEL: CPT

## 2022-12-03 PROCEDURE — 36415 COLL VENOUS BLD VENIPUNCTURE: CPT

## 2022-12-03 PROCEDURE — 85025 COMPLETE CBC W/AUTO DIFF WBC: CPT

## 2022-12-03 PROCEDURE — 70450 CT HEAD/BRAIN W/O DYE: CPT

## 2022-12-03 PROCEDURE — 71046 X-RAY EXAM CHEST 2 VIEWS: CPT

## 2022-12-03 PROCEDURE — 84484 ASSAY OF TROPONIN QUANT: CPT

## 2022-12-03 RX ADMIN — SODIUM CHLORIDE 1000 ML: 9 INJECTION, SOLUTION INTRAVENOUS at 10:24

## 2022-12-03 NOTE — DISCHARGE INSTRUCTIONS
You were evaluated in the emergency department for a near passing out episode. Your examination was reassuring as was your work-up including blood work, EKG, chest x-ray, and CT head. It will be important for you to follow-up with your primary care physician in 2-3 days. If you develop worsening symptoms such as passing out episodes, chest pain, shortness of breath, or palpitations, please return to the emergency department immediately.

## 2022-12-03 NOTE — ED PROVIDER NOTES
EMERGENCY DEPARTMENT HISTORY AND PHYSICAL EXAM      Date: 12/3/2022  Patient Name: Shilpa Rodriguez    History of Presenting Illness     Chief Complaint   Patient presents with    Syncope     Standing at the time, at work (he is a [employer]), felt like he was going to faint and fall out with a heaviness in his head. Did not faint. This occurred 2 to 3 weeks ago. History Provided By: Patient    HPI: Shilpa Rodriguez, 81 y.o. 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 He states that he was standing for about 15 to 20 minutes when he tilted his head downward and developed lightheadedness and near syncope. He started feel better but then had another positional change of his head which caused him to have worsening lightheadedness, he then sat down and felt much better. Denies any associated dizziness, headache, visual changes, chest pain, shortness of breath, palpitations, nausea or vomiting. Symptoms have happened before but never to this extent. He states that he ate a doughnut and coffee for breakfast this morning, ate a normal dinner last night. Denies any recent illness or recent medication changes. Denies any alcohol or drug use. Currently feels better here in the ED and at baseline. There are no other complaints, changes, or physical findings at this time. PCP: [place], MD    No current facility-administered medications on file prior to encounter.      Current Outpatient Medications on File Prior to Encounter   Medication Sig Dispense Refill    amLODIPine (NORVASC) 10 mg tablet TAKE 1 TABLET BY MOUTH EVERY DAY FOR BLOOD PRESSURE 90 Tablet 6    hydroCHLOROthiazide (MICROZIDE) 12.5 mg capsule TAKE ONE CAPSULE EVERY DAY FOR FOR BLOOD PRESSURE 90 Capsule 3    atorvastatin (LIPITOR) 40 mg tablet TAKE 1 TABLET BY MOUTH EVERY DAY FOR CHOLESTEROL 90 Tablet 3    aspirin 81 mg chewable tablet Take 1 Tab by mouth daily. 30 Tab 12       Past History     Past Medical History:  Past Medical History:   Diagnosis Date    Hepatitis B     Hepatitis C     Hypercholesterolemia     Hypertension     LBP (low back pain) 9/8/2010    Nodule     left adrenal gland       Past Surgical History:  Past Surgical History:   Procedure Laterality Date    COLONOSCOPY N/A 3/1/2022    COLONOSCOPY   :- performed by Jaz Gleason MD at [facility] ENDOSCOPY    ENDOSCOPY, COLON, DIAGNOSTIC  6/2006    Dr Spivey Friend polyp    HX APPENDECTOMY      HX COLONOSCOPY  2016    one polyp- repeat 5 years    HX COLONOSCOPY  02/2017    dr. Scooter Levy, one polyp- repeat in 5 years       Family History:  Reviewed, negative    Social History:  Social History     Tobacco Use    Smoking status: Every Day    Smokeless tobacco: Never    Tobacco comments:     1-2 cigars per week   Substance Use Topics    Alcohol use: Yes     Comment: 2-3 beers/day    Drug use: Yes     Types: Marijuana       Allergies:  No Known Allergies      Review of Systems   Review of Systems   Constitutional:  Negative for chills and fever. Eyes:  Negative for visual disturbance. Respiratory:  Negative for cough and shortness of breath. Cardiovascular:  Negative for chest pain and leg swelling. Gastrointestinal:  Negative for abdominal pain, nausea and vomiting. Genitourinary: Negative. Musculoskeletal:  Negative for back pain and gait problem. Skin:  Negative for color change and rash. Neurological:  Positive for light-headedness. Negative for dizziness, syncope, speech difficulty, weakness and headaches. All other systems reviewed and are negative. Physical Exam   Physical Exam  Vitals and nursing note reviewed. Constitutional:       General: He is not in acute distress. Appearance: Normal appearance. He is not ill-appearing or toxic-appearing. HENT:      Head: Normocephalic and atraumatic.       Nose: Nose normal.      Mouth/Throat:      Mouth: Mucous membranes are moist.   Eyes:      Extraocular Movements: Extraocular movements intact. Pupils: Pupils are equal, round, and reactive to light. Cardiovascular:      Rate and Rhythm: Regular rhythm. Bradycardia present. Heart sounds: No murmur heard. Pulmonary:      Effort: Pulmonary effort is normal. No respiratory distress. Breath sounds: Normal breath sounds. No wheezing. Abdominal:      General: There is no distension. Palpations: Abdomen is soft. Tenderness: There is no abdominal tenderness. There is no guarding or rebound. Musculoskeletal:         General: No swelling or tenderness. Normal range of motion. Cervical back: Normal range of motion and neck supple. Right lower leg: No edema. Left lower leg: No edema. Skin:     General: Skin is warm and dry. Coloration: Skin is not pale. Findings: No erythema. Neurological:      General: No focal deficit present. Mental Status: He is alert and oriented to person, place, and time. Comments: Cranial nerves intact, motor 5/5 throughout, sensation intact, no cerebellar deficits.        Diagnostic Study Results     Labs -     Recent Results (from the past 12 hour(s))   EKG, 12 LEAD, INITIAL    Collection Time: 12/03/22  9:06 AM   Result Value Ref Range    Ventricular Rate 57 BPM    Atrial Rate 57 BPM    P-R Interval 164 ms    QRS Duration 94 ms    Q-T Interval 472 ms    QTC Calculation (Bezet) 459 ms    Calculated P Axis -24 degrees    Calculated R Axis 70 degrees    Calculated T Axis 61 degrees    Diagnosis       Sinus bradycardia  When compared with ECG of 03-NOV-2020 11:56,  No significant change was found  Confirmed by Alexus Poole ([id number]) on 12/3/2022 4:20:20 PM     CBC WITH AUTOMATED DIFF    Collection Time: 12/03/22  9:35 AM   Result Value Ref Range    WBC 5.9 4.1 - 11.1 K/uL    RBC 3.55 (L) 4.10 - 5.70 M/uL    HGB 12.2 12.1 - 17.0 g/dL    HCT 35.7 (L) 36.6 - 50.3 %    .6 (H) 80.0 - 99.0 FL    MCH 34.4 (H) 26.0 - 34.0 PG    MCHC 34.2 30.0 - 36.5 g/dL    RDW 12.7 11.5 - 14.5 %    PLATELET 391 397 - 649 K/uL    MPV 9.1 8.9 - 12.9 FL    NRBC 0.0 0  WBC    ABSOLUTE NRBC 0.00 0.00 - 0.01 K/uL    NEUTROPHILS 40 32 - 75 %    LYMPHOCYTES 43 12 - 49 %    MONOCYTES 11 5 - 13 %    EOSINOPHILS 5 0 - 7 %    BASOPHILS 1 0 - 1 %    IMMATURE GRANULOCYTES 0 0.0 - 0.5 %    ABS. NEUTROPHILS 2.4 1.8 - 8.0 K/UL    ABS. LYMPHOCYTES 2.5 0.8 - 3.5 K/UL    ABS. MONOCYTES 0.6 0.0 - 1.0 K/UL    ABS. EOSINOPHILS 0.3 0.0 - 0.4 K/UL    ABS. BASOPHILS 0.1 0.0 - 0.1 K/UL    ABS. IMM. GRANS. 0.0 0.00 - 0.04 K/UL    DF AUTOMATED     METABOLIC PANEL, COMPREHENSIVE    Collection Time: 12/03/22  9:35 AM   Result Value Ref Range    Sodium 138 136 - 145 mmol/L    Potassium 3.8 3.5 - 5.1 mmol/L    Chloride 103 97 - 108 mmol/L    CO2 30 21 - 32 mmol/L    Anion gap 5 5 - 15 mmol/L    Glucose 96 65 - 100 mg/dL    BUN 13 6 - 20 MG/DL    Creatinine 1.22 0.70 - 1.30 MG/DL    BUN/Creatinine ratio 11 (L) 12 - 20      eGFR >60 >60 ml/min/1.73m2    Calcium 9.6 8.5 - 10.1 MG/DL    Bilirubin, total 0.6 0.2 - 1.0 MG/DL    ALT (SGPT) 37 12 - 78 U/L    AST (SGOT) 28 15 - 37 U/L    Alk. phosphatase 94 45 - 117 U/L    Protein, total 7.5 6.4 - 8.2 g/dL    Albumin 4.4 3.5 - 5.0 g/dL    Globulin 3.1 2.0 - 4.0 g/dL    A-G Ratio 1.4 1.1 - 2.2     NT-PRO BNP    Collection Time: 12/03/22  9:35 AM   Result Value Ref Range    NT pro- (H) <125 PG/ML   TROPONIN-HIGH SENSITIVITY    Collection Time: 12/03/22  9:35 AM   Result Value Ref Range    Troponin-High Sensitivity 6 0 - 76 ng/L   TROPONIN-HIGH SENSITIVITY    Collection Time: 12/03/22 11:14 AM   Result Value Ref Range    Troponin-High Sensitivity 6 0 - 76 ng/L       Radiologic Studies -   CT HEAD WO CONT   Final Result   No acute abnormality. XR CHEST PA LAT   Final Result   No acute findings.             CT Results  (Last 48 hours) 12/03/22 1035  CT HEAD WO CONT Final result    Impression:  No acute abnormality. Narrative:  EXAM: CT HEAD WO CONT       INDICATION: syncope, head \"heaviness\"       COMPARISON: CT head 11/3/2020. CONTRAST: None. TECHNIQUE: Unenhanced CT of the head was performed using 5 mm images. Brain and   bone windows were generated. Coronal and sagittal reformats. CT dose reduction   was achieved through use of a standardized protocol tailored for this   examination and automatic exposure control for dose modulation. FINDINGS:   Mild generalized volume loss. There is no significant white matter disease. There is no intracranial hemorrhage, extra-axial collection, or mass effect. The   basilar cisterns are open. No CT evidence of acute infarct. The bone windows demonstrate no abnormalities. The visualized portions of the   paranasal sinuses and mastoid air cells are clear. CXR Results  (Last 48 hours)                 12/03/22 0929  XR CHEST PA LAT Final result    Impression:  No acute findings. Narrative:  EXAM: XR CHEST PA LAT       INDICATION: Chest Pain        COMPARISON: Chest radiographs 9/7/2021       TECHNIQUE: PA and lateral chest views       FINDINGS:   Cardiomediastinal silhouette within normal limits. Lungs and pleural spaces are   grossly clear. Medical Decision Making   I am the first provider for this patient. I reviewed the vital signs, available nursing notes, past medical history, past surgical history, family history and social history. Vital Signs-Reviewed the patient's vital signs.   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 Afebrile and vital signs stable. Symptoms appear to be associated with positional head changes but he denies any dizziness. Differential diagnosis includes BPPV, autonomic dysfunction, vasovagal near syncope, doubt arrhythmia, consider electrolyte dysfunction, dehydration, orthostatics. Will evaluate with orthostatic vital signs, blood work, serial cardiac enzymes, EKG, CT head, chest x-ray, and reassess. Work-up unremarkable including serial cardiac enzymes, EKG, CT imaging, chest x-ray, and orthostatic vital signs. No arrhythmia while patient was observed on cardiac monitor. Vital signs stable. Patient has no further symptoms and remains asymptomatic encourage close follow-up with PCP and given strict return ED precautions. All questions answered and he agrees with plan as above      ED Course as of 12/03/22 1925   Sat Dec 03, 2022   1101 EKG per my interpretation sinus bradycardia, rate 57 bpm, normal axis, no acute ischemic changes or interval changes. [AK]      ED Course User Index  [AK] Kristy Nobles MD         Cardiac Monitoring: The cardiac monitor revealed the following rhythm as interpreted by me: Sinus bradycardia, rate 55 bpm  The cardiac monitor was ordered secondary to the patient's reported complaint of near syncope and to monitor the patient for dysrhythmia. Elva Zhong MD      ED Course:   Initial assessment performed. The patients presenting problems have been discussed, and they are in agreement with the care plan formulated and outlined with them. I have encouraged them to ask questions as they arise throughout their visit. Discharge Note:  The patient has been re-evaluated and is ready for discharge. Reviewed available results with patient. Counseled patient on diagnosis and care plan. Patient has expressed understanding, and all questions have been answered. Patient agrees with plan and agrees to follow up as recommended, or to return to the ED if their symptoms worsen. Discharge instructions have been provided and explained to the patient, along with reasons to return to the ED. Disposition:  discharge      DISCHARGE PLAN:  1. Discharge Medication List as of 12/3/2022  1:44 PM        2. Follow-up Information       Follow up With Specialties Details Why Contact Sergio Arnold MD [facility] Schedule an appointment as soon as possible for a visit   [address]       [facility] EMERGENCY DEPT Emergency Medicine Go to  As needed, If symptoms worsen [address] [facility]  [address] [facility]  [phone]          3. Return to ED if worse     Diagnosis     Clinical Impression:   1. Near syncope        Attestations:  I am the first and primary provider of record for this patient's ED encounter. I personally performed the services described above in this documentation. Oren Elliott MD    Please note that this dictation was completed with [employer]Line, the computer voice recognition software. Quite often unanticipated grammatical, syntax, homophones, and other interpretive errors are inadvertently transcribed by the computer software. Please disregard these errors. Please excuse any errors that have escaped final proofreading. Thank you.

## 2022-12-12 ENCOUNTER — OFFICE VISIT (OUTPATIENT)
Dept: FAMILY MEDICINE CLINIC | Age: 72
End: 2022-12-12
Payer: COMMERCIAL

## 2022-12-12 VITALS
HEART RATE: 62 BPM | SYSTOLIC BLOOD PRESSURE: 115 MMHG | BODY MASS INDEX: 21.02 KG/M2 | OXYGEN SATURATION: 99 % | TEMPERATURE: 97.5 F | RESPIRATION RATE: 18 BRPM | WEIGHT: 155.2 LBS | DIASTOLIC BLOOD PRESSURE: 61 MMHG | HEIGHT: 72 IN

## 2022-12-12 DIAGNOSIS — D53.9 MACROCYTIC ANEMIA: Primary | ICD-10-CM

## 2022-12-12 DIAGNOSIS — T40.2X1A OPIOID OVERDOSE, ACCIDENTAL OR UNINTENTIONAL, INITIAL ENCOUNTER (HCC): ICD-10-CM

## 2022-12-12 DIAGNOSIS — B18.1 CHRONIC HEPATITIS B (HCC): ICD-10-CM

## 2022-12-12 DIAGNOSIS — I95.1 ORTHOSTASIS: ICD-10-CM

## 2022-12-12 PROCEDURE — 3074F SYST BP LT 130 MM HG: CPT | Performed by: FAMILY MEDICINE

## 2022-12-12 PROCEDURE — 3078F DIAST BP <80 MM HG: CPT | Performed by: FAMILY MEDICINE

## 2022-12-12 PROCEDURE — 1123F ACP DISCUSS/DSCN MKR DOCD: CPT | Performed by: FAMILY MEDICINE

## 2022-12-12 PROCEDURE — 99213 OFFICE O/P EST LOW 20 MIN: CPT | Performed by: FAMILY MEDICINE

## 2022-12-12 RX ORDER — AMLODIPINE BESYLATE 2.5 MG/1
2.5 TABLET ORAL DAILY
Qty: 90 TABLET | Refills: 3 | Status: SHIPPED | OUTPATIENT
Start: 2022-12-12

## 2022-12-12 NOTE — PROGRESS NOTES
HISTORY OF PRESENT ILLNESS  Susie Herbert is a 81 y.o. male. HPI has a one month hx of near syncope. 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 No increase in symptoms . Seems to be worse when leans to the left. Latasha Clayoms to ER , was told that was dehydrated, Was given iv fluids, and has been doing ok since then. Is also currently on methadone. ROS    Physical Exam  Vitals and nursing note reviewed. Constitutional:       Appearance: He is well-developed. HENT:      Right Ear: External ear normal.      Left Ear: External ear normal.      Ears:      Comments: Cerumen impactions bilateraly  Neck:      Thyroid: No thyromegaly. Cardiovascular:      Rate and Rhythm: Normal rate and regular rhythm. Heart sounds: Normal heart sounds. Comments: Bp 120/80 sitting, 115/75 standing,. Pulmonary:      Effort: Pulmonary effort is normal. No respiratory distress. Breath sounds: Normal breath sounds. No wheezing. Abdominal:      General: Bowel sounds are normal. There is no distension. Palpations: Abdomen is soft. There is no mass. Tenderness: There is no abdominal tenderness. There is no guarding. Musculoskeletal:         General: Normal range of motion. Comments: Neck-  full rom, no tenderness. No carotid bruit   Lymphadenopathy:      Cervical: No cervical adenopathy. ASSESSMENT and PLAN  Orders Placed This Encounter    VITAMIN B12 & FOLATE    TSH 3RD GENERATION    T4, FREE    amLODIPine (NORVASC) 2.5 mg tablet     Diagnoses and all orders for this visit:    1. Macrocytic anemia  -     VITAMIN B12 & FOLATE; Future  -     TSH 3RD GENERATION; Future  -     T4, FREE; Future    2. Opioid overdose, accidental or unintentional, initial encounter ([facility] [address]    3. Chronic hepatitis B ([facility] [address]    4. Orthostasis    Other orders  -     amLODIPine (NORVASC) 2.5 mg tablet; Take 1 Tablet by mouth daily.  For blood pressure        Will also decrease amlodipine from 10 mg daily to 2.5 mg daily

## 2022-12-12 NOTE — PROGRESS NOTES
Patient identified with two identification factors, Name and Date of Birth. Chief Complaint   Patient presents with    Hospital Follow Up     Pt state he went to ED [facility] 12/03/22 for possible episode of fainting. Pt stated he was dehydrated. Pt stated he will like both ear checked to make sure they are no impacted. Annual Wellness Visit       Visit Vitals  /61 (BP 1 Location: Right arm, BP Patient Position: Sitting, BP Cuff Size: Adult)   Pulse 62   Temp 97.5 °F (36.4 °C) (Oral)   Resp 18   Ht 6' (1.829 m)   Wt 155 lb 3.2 oz (70.4 kg)   SpO2 99%   BMI 21.05 kg/m²         1. \"Have you been to the ER, urgent care clinic since your last visit? Hospitalized since your last visit? \"  Yes. [facility] 12/03/22. Pt stated he felt as though he was about to faint. 2. \"Have you seen or consulted any other health care providers outside of the [address] [place] [address] since your last visit? \" No

## 2022-12-13 LAB
FOLATE SERPL-MCNC: 14.7 NG/ML (ref 5–21)
T4 FREE SERPL-MCNC: 1 NG/DL (ref 0.8–1.5)
TSH SERPL DL<=0.05 MIU/L-ACNC: 1.29 UIU/ML (ref 0.36–3.74)
VIT B12 SERPL-MCNC: 419 PG/ML (ref 193–986)

## 2023-06-20 ENCOUNTER — TELEPHONE (OUTPATIENT)
Age: 73
End: 2023-06-20

## 2023-06-20 NOTE — TELEPHONE ENCOUNTER
----- Message from Taylor Dela Cruz sent at 6/16/2023  9:32 AM EDT -----  Subject: Referral Request    Reason for referral request? labs  Provider patient wants to be referred to(if known):     Provider Phone Number(if known):     Additional Information for Provider?   ---------------------------------------------------------------------------  --------------  [address] [employer]    [phone]; OK to leave message on voicemail  ---------------------------------------------------------------------------  --------------

## 2023-06-23 NOTE — TELEPHONE ENCOUNTER
Patient stated that he called three days ago to see if Dr. Chicho Lake had an earlier appointment other than his Aug 9th appointment. Checked and there was none available. Patient stated \"Call back when Dr. Chicho Lake has an earlier appointment\" tried to explain to patient that that's not how the process worked and that he will have to call regularly to check on that update.

## 2023-08-09 ENCOUNTER — OFFICE VISIT (OUTPATIENT)
Age: 73
End: 2023-08-09
Payer: COMMERCIAL

## 2023-08-09 VITALS
RESPIRATION RATE: 16 BRPM | BODY MASS INDEX: 21.56 KG/M2 | OXYGEN SATURATION: 99 % | WEIGHT: 159.2 LBS | HEART RATE: 56 BPM | HEIGHT: 72 IN

## 2023-08-09 DIAGNOSIS — R53.1 WEAKNESS: ICD-10-CM

## 2023-08-09 DIAGNOSIS — I10 ESSENTIAL HYPERTENSION: ICD-10-CM

## 2023-08-09 DIAGNOSIS — L30.9 DERMATITIS: ICD-10-CM

## 2023-08-09 DIAGNOSIS — Z00.00 ENCOUNTER FOR MEDICARE ANNUAL WELLNESS EXAM: Primary | ICD-10-CM

## 2023-08-09 DIAGNOSIS — E78.00 HYPERCHOLESTEROLEMIA: ICD-10-CM

## 2023-08-09 PROCEDURE — G0439 PPPS, SUBSEQ VISIT: HCPCS | Performed by: FAMILY MEDICINE

## 2023-08-09 PROCEDURE — 1123F ACP DISCUSS/DSCN MKR DOCD: CPT | Performed by: FAMILY MEDICINE

## 2023-08-09 PROCEDURE — 99213 OFFICE O/P EST LOW 20 MIN: CPT | Performed by: FAMILY MEDICINE

## 2023-08-09 RX ORDER — TRIAMCINOLONE ACETONIDE 5 MG/G
CREAM TOPICAL
Qty: 454 G | Refills: 1 | Status: SHIPPED | OUTPATIENT
Start: 2023-08-09 | End: 2023-08-16

## 2023-08-09 RX ORDER — PREDNISONE 20 MG/1
TABLET ORAL
Qty: 21 TABLET | Refills: 0 | Status: SHIPPED | OUTPATIENT
Start: 2023-08-09

## 2023-08-09 NOTE — PROGRESS NOTES
Chief Complaint   Patient presents with    Medicare AWV         1. \"Have you been to the ER, urgent care clinic since your last visit? Hospitalized since your last visit? \" no    2. \"Have you seen or consulted any other health care providers outside of the [address] [place] [address] since your last visit? \" no     3. For patients aged 43-73: Has the patient had a colonoscopy / FIT/ Cologuard? yes      If the patient is female:    4. For patients aged 43-66: Has the patient had a mammogram within the past 2 years? na      5.  For patients aged 21-65: Has the patient had a pap smear? na      Health Maintenance Due   Topic Date Due    Hepatitis A vaccine (1 of 2 - Risk 2-dose series) Never done    Pneumococcal 65+ years Vaccine (1 - PCV) Never done    Hepatitis B vaccine (1 of 3 - Risk 3-dose series) Never done    DTaP/Tdap/Td vaccine (1 - Tdap) Never done    Shingles vaccine (1 of 2) Never done    COVID-19 Vaccine (3 - Booster for Pfizer series) 05/14/2021    Annual Wellness Visit (AWV)  05/23/2022    Lipids  09/07/2022    Flu vaccine (1) Never done

## 2023-08-09 NOTE — PROGRESS NOTES
Consuelo Soriano (:  1950) is a 72 y.o. male,Established patient, here for evaluation of the following chief complaint(s):  Medicare AWV         ASSESSMENT/PLAN:  1. Encounter for Medicare annual wellness exam  2. Dermatitis  -     External Referral To Dermatology  3. Hypercholesterolemia  -     Lipid Panel; Future  4. Essential hypertension  -     CBC with Auto Differential; Future  -     Comprehensive Metabolic Panel; Future  -     Urinalysis with Microscopic; Future  5. Weakness  -     T4, Free; Future  -     TSH; Future      Return in about 6 months (around 2024). Subjective   SUBJECTIVE/OBJECTIVE:  HPI Has a rash on arms and legs for 3 months, moderately pruritic. Has been coming off of methadone. No new soaps, laundry detergents. Thinks will be off methadone in one month. Needs blood pressure and cholesterol checked. Menthol lotion- some relief. Also needs medicare wellness exam. Not seeing any other doctors. UTD on colonoscopy. Has had covid shots, declines other immunizations. Would want wife Romina  to be his mPOA if he became incapacitated. Review of Systems   HENT:  Negative for hearing loss. Neurological:         No falls, canes. Independent in all adls. Memory good. Psychiatric/Behavioral:          Not depressed. Drinks one beer a day. Objective   Physical Exam  Cardiovascular:      Rate and Rhythm: Normal rate and regular rhythm. Heart sounds: Normal heart sounds. No murmur heard. Pulmonary:      Effort: Pulmonary effort is normal.      Breath sounds: Normal breath sounds. Abdominal:      General: Abdomen is flat. Bowel sounds are normal.      Palpations: Abdomen is soft. Musculoskeletal:      Right lower leg: No edema. Left lower leg: No edema. Skin:     Comments: Scaly rash on arms and legs with pruplash scaly papules 1-2 cm in diameter on back. An electronic signature was used to authenticate this note.     --Bernadine Cortez

## 2023-08-10 LAB
ALBUMIN SERPL-MCNC: 4.7 G/DL (ref 3.5–5)
ALBUMIN/GLOB SERPL: 1.6 (ref 1.1–2.2)
ALP SERPL-CCNC: 96 U/L (ref 45–117)
ALT SERPL-CCNC: 48 U/L (ref 12–78)
ANION GAP SERPL CALC-SCNC: 7 MMOL/L (ref 5–15)
APPEARANCE UR: CLEAR
AST SERPL-CCNC: 42 U/L (ref 15–37)
BACTERIA URNS QL MICRO: NEGATIVE /HPF
BASOPHILS # BLD: 0 K/UL (ref 0–0.1)
BASOPHILS NFR BLD: 1 % (ref 0–1)
BILIRUB SERPL-MCNC: 0.5 MG/DL (ref 0.2–1)
BILIRUB UR QL: NEGATIVE
BUN SERPL-MCNC: 12 MG/DL (ref 6–20)
BUN/CREAT SERPL: 10 (ref 12–20)
CALCIUM SERPL-MCNC: 9.8 MG/DL (ref 8.5–10.1)
CHLORIDE SERPL-SCNC: 103 MMOL/L (ref 97–108)
CHOLEST SERPL-MCNC: 158 MG/DL
CO2 SERPL-SCNC: 27 MMOL/L (ref 21–32)
COLOR UR: NORMAL
CREAT SERPL-MCNC: 1.19 MG/DL (ref 0.7–1.3)
DIFFERENTIAL METHOD BLD: ABNORMAL
EOSINOPHIL # BLD: 0.2 K/UL (ref 0–0.4)
EOSINOPHIL NFR BLD: 4 % (ref 0–7)
EPITH CASTS URNS QL MICRO: NORMAL /LPF
ERYTHROCYTE [DISTWIDTH] IN BLOOD BY AUTOMATED COUNT: 13.1 % (ref 11.5–14.5)
GLOBULIN SER CALC-MCNC: 3 G/DL (ref 2–4)
GLUCOSE SERPL-MCNC: 90 MG/DL (ref 65–100)
GLUCOSE UR STRIP.AUTO-MCNC: NEGATIVE MG/DL
HCT VFR BLD AUTO: 41.8 % (ref 36.6–50.3)
HDLC SERPL-MCNC: 81 MG/DL
HDLC SERPL: 2 (ref 0–5)
HGB BLD-MCNC: 13.9 G/DL (ref 12.1–17)
HGB UR QL STRIP: NEGATIVE
HYALINE CASTS URNS QL MICRO: NORMAL /LPF (ref 0–5)
IMM GRANULOCYTES # BLD AUTO: 0 K/UL (ref 0–0.04)
IMM GRANULOCYTES NFR BLD AUTO: 0 % (ref 0–0.5)
KETONES UR QL STRIP.AUTO: NEGATIVE MG/DL
LDLC SERPL CALC-MCNC: 56.8 MG/DL (ref 0–100)
LEUKOCYTE ESTERASE UR QL STRIP.AUTO: NEGATIVE
LYMPHOCYTES # BLD: 2.9 K/UL (ref 0.8–3.5)
LYMPHOCYTES NFR BLD: 48 % (ref 12–49)
MCH RBC QN AUTO: 34.5 PG (ref 26–34)
MCHC RBC AUTO-ENTMCNC: 33.3 G/DL (ref 30–36.5)
MCV RBC AUTO: 103.7 FL (ref 80–99)
MONOCYTES # BLD: 0.5 K/UL (ref 0–1)
MONOCYTES NFR BLD: 9 % (ref 5–13)
NEUTS SEG # BLD: 2.2 K/UL (ref 1.8–8)
NEUTS SEG NFR BLD: 38 % (ref 32–75)
NITRITE UR QL STRIP.AUTO: NEGATIVE
NRBC # BLD: 0 K/UL (ref 0–0.01)
NRBC BLD-RTO: 0 PER 100 WBC
PH UR STRIP: 5.5 (ref 5–8)
PLATELET # BLD AUTO: 202 K/UL (ref 150–400)
PMV BLD AUTO: 10.2 FL (ref 8.9–12.9)
POTASSIUM SERPL-SCNC: 3.8 MMOL/L (ref 3.5–5.1)
PROT SERPL-MCNC: 7.7 G/DL (ref 6.4–8.2)
PROT UR STRIP-MCNC: NEGATIVE MG/DL
RBC # BLD AUTO: 4.03 M/UL (ref 4.1–5.7)
RBC #/AREA URNS HPF: NORMAL /HPF (ref 0–5)
SODIUM SERPL-SCNC: 137 MMOL/L (ref 136–145)
SP GR UR REFRACTOMETRY: 1.01 (ref 1–1.03)
T4 FREE SERPL-MCNC: 1 NG/DL (ref 0.8–1.5)
TRIGL SERPL-MCNC: 101 MG/DL
TSH SERPL DL<=0.05 MIU/L-ACNC: 1.65 UIU/ML (ref 0.36–3.74)
UROBILINOGEN UR QL STRIP.AUTO: 0.2 EU/DL (ref 0.2–1)
VLDLC SERPL CALC-MCNC: 20.2 MG/DL
WBC # BLD AUTO: 5.9 K/UL (ref 4.1–11.1)
WBC URNS QL MICRO: NORMAL /HPF (ref 0–4)

## 2023-09-18 DIAGNOSIS — Z00.00 ENCOUNTER FOR GENERAL ADULT MEDICAL EXAMINATION WITHOUT ABNORMAL FINDINGS: ICD-10-CM

## 2023-09-19 RX ORDER — HYDROCHLOROTHIAZIDE 12.5 MG/1
CAPSULE, GELATIN COATED ORAL
Qty: 90 CAPSULE | Refills: 3 | Status: SHIPPED | OUTPATIENT
Start: 2023-09-19

## 2023-10-12 NOTE — DISCHARGE INSTRUCTIONS
Opioid Overdose: Care Instructions  Your Care Instructions     You have had treatment to help your body recover from taking too much of an opioid. You are getting better, but you may not feel well for a while. It takes time for the opioids to leave your body. How long it takes to feel better depends on which drug you took and how much you took of it. Opioids include illegal drugs such as heroin, often called smack, junk, H, and ska. Opioids also include medicines that doctors prescribe to treat pain. These are medicines such as oxycodone, methadone, and buprenorphine. They are sometimes sold and used illegally. Taking too much of an opioid can be dangerous. It may cause:  · Trouble breathing. · Low blood pressure. · A low heart rate. · A coma. When the doctor treated you for the overdose, he or she may have:  · Watched your symptoms or done tests to find out what kind of drug you took. · Given you fluids. · Given you oxygen to help you breathe. · Given you a medicine called naloxone to help reverse the effects of the opioid. · Done several tests, including blood tests, to see how you're responding to treatment. The doctor also watched you carefully to make sure you were recovering safely. Follow-up care is a key part of your treatment and safety. Be sure to make and go to all appointments, and call your doctor if you are having problems. It's also a good idea to know your test results and keep a list of the medicines you take. How can you care for yourself at home? · If you take opioids regularly, your body gets used to them. This is called physical dependence. If you are physically dependent on opioids, you may have withdrawal symptoms when you stop using them or use less. These symptoms can include nausea, sweating, chills, diarrhea, stomach cramps, and muscle aches. Withdrawal can last up to several weeks, depending on which opioid you took and how long you took it.  You may feel very ill, but you probably aren't in medical danger. · Your doctor may give you medicine to help you feel better. To help you get through withdrawal, you can also:  ? Get plenty of rest.  ? Drink plenty of fluids. ? Stay active. ? Eat a healthy diet. · If you had a tube in your throat to help you breathe, you may have a sore throat or hoarseness that can last a few days. Sip liquids to help soothe your throat. · Do not drink alcohol or take illegal drugs. · Do not take medicines that make you tired, like sleeping pills or muscle relaxers. · Do not drive if you feel sleepy or groggy while you recover from an overdose. · Get help to stop using opioids. Talk to your doctor about substance use treatment programs. · Talk to your doctor or pharmacist about having a naloxone rescue kit on hand. When should you call for help? Call 911 anytime you think you may need emergency care. For example, call if:    · You feel you cannot stop from hurting yourself or someone else. Call your doctor now or seek immediate medical care if:    · You have new or worse withdrawal symptoms, such as:  ? Stomach cramps. ? Vomiting. ? Diarrhea. ? Muscle aches. ? Sweating. Watch closely for changes in your health, and be sure to contact your doctor if:    · You do not get better as expected. Where can you learn more? Go to http://www.gray.com/  Enter Z171 in the search box to learn more about \"Opioid Overdose: Care Instructions. \"  Current as of: June 29, 2020               Content Version: 12.6  © 9891-9955 Healthwise, Incorporated. Care instructions adapted under license by [employer] (which disclaims liability or warranty for this information). If you have questions about a medical condition or this instruction, always ask your healthcare professional. [place] [address] any warranty or liability for your use of this information.     Patient Education     Altered Mental Status: Care Instructions  Your Care Instructions  Altered mental status is a change in how well your brain is working. As a result, you may be confused, be less alert than usual, or act in odd ways. This may include seeing or hearing things that aren't really there (hallucinations). A mental status change has many possible causes. For example, it may be the result of an infection, an imbalance of chemicals in the body, or a chronic disease such as diabetes or COPD. It can also be caused by things such as a head injury, taking certain medicines, or using alcohol or drugs. The doctor may do tests to look for the cause. These tests may include urine tests, blood tests, and imaging tests such as a CT scan. Sometimes a clear cause isn't found. But tests can help the doctor rule out a serious cause of your symptoms. A change in mental status can be scary. But mental status will often return to normal when the cause is treated. So it is important to get any follow-up testing or treatment the doctor has suggested. The doctor has checked you carefully, but problems can develop later. If you notice any problems or new symptoms, get medical treatment right away. Follow-up care is a key part of your treatment and safety. Be sure to make and go to all appointments, and call your doctor if you are having problems. It's also a good idea to know your test results and keep a list of the medicines you take. How can you care for yourself at home? · Be safe with medicines. Take your medicines exactly as prescribed. Call your doctor if you think you are having a problem with your medicine. · Have another adult stay with you until you are better. This can help keep you safe. Ask that person to watch for signs that your mental status is getting worse. When should you call for help? Call 911 anytime you think you may need emergency care. For example, call if:  · You passed out (lost consciousness).   Call your doctor now or seek immediate medical care if:  · Your mental status is getting worse. · You have new symptoms, such as a fever, chills, or shortness of breath. · You do not feel safe. Watch closely for changes in your health, and be sure to contact your doctor if:  · You do not get better as expected. Where can you learn more? Go to [employer]  Enter J452 in the search box to learn more about \"Altered Mental Status: Care Instructions. \"   © 3690-6495 Healthwise, Incorporated. Care instructions adapted under license by [facility] (which disclaims liability or warranty for this information). This care instruction is for use with your licensed healthcare professional. If you have questions about a medical condition or this instruction, always ask your healthcare professional. [place] [address] any warranty or liability for your use of this information.   Content Version: 97.4.486910; Current as of: November 20, 2015 Nsaids Counseling: NSAID Counseling: I discussed with the patient that NSAIDs should be taken with food. Prolonged use of NSAIDs can result in the development of stomach ulcers.  Patient advised to stop taking NSAIDs if abdominal pain occurs.  The patient verbalized understanding of the proper use and possible adverse effects of NSAIDs.  All of the patient's questions and concerns were addressed.

## 2023-12-13 RX ORDER — AMLODIPINE BESYLATE 2.5 MG/1
2.5 TABLET ORAL DAILY
Qty: 90 TABLET | Refills: 3 | Status: SHIPPED | OUTPATIENT
Start: 2023-12-13

## 2024-01-11 ENCOUNTER — TELEPHONE (OUTPATIENT)
Age: 74
End: 2024-01-11

## 2024-01-11 NOTE — TELEPHONE ENCOUNTER
----- Message from Danna Mario sent at 1/11/2024  1:28 PM EST -----  Subject: Referral Request    Reason for referral request? Pt is requesting a referral to see a   Dermatologist.  Provider patient wants to be referred to(if known):     Provider Phone Number(if known):    Additional Information for Provider?   ---------------------------------------------------------------------------  --------------  CALL BACK INFO    [phone]; OK to leave message on voicemail  ---------------------------------------------------------------------------  --------------

## 2024-03-26 ENCOUNTER — OFFICE VISIT (OUTPATIENT)
Age: 74
End: 2024-03-26
Payer: COMMERCIAL

## 2024-03-26 VITALS
DIASTOLIC BLOOD PRESSURE: 62 MMHG | HEART RATE: 54 BPM | SYSTOLIC BLOOD PRESSURE: 158 MMHG | RESPIRATION RATE: 16 BRPM | WEIGHT: 167.2 LBS | TEMPERATURE: 98 F | HEIGHT: 72 IN | BODY MASS INDEX: 22.65 KG/M2 | OXYGEN SATURATION: 98 %

## 2024-03-26 DIAGNOSIS — B36.0 TINEA VERSICOLOR: Primary | ICD-10-CM

## 2024-03-26 PROCEDURE — 1123F ACP DISCUSS/DSCN MKR DOCD: CPT | Performed by: FAMILY MEDICINE

## 2024-03-26 PROCEDURE — 99213 OFFICE O/P EST LOW 20 MIN: CPT | Performed by: FAMILY MEDICINE

## 2024-03-26 PROCEDURE — 3077F SYST BP >= 140 MM HG: CPT | Performed by: FAMILY MEDICINE

## 2024-03-26 PROCEDURE — 3078F DIAST BP <80 MM HG: CPT | Performed by: FAMILY MEDICINE

## 2024-03-26 RX ORDER — FLUCONAZOLE 150 MG/1
TABLET ORAL
Qty: 4 TABLET | Refills: 0 | Status: SHIPPED | OUTPATIENT
Start: 2024-03-26

## 2024-03-26 RX ORDER — TRIAMCINOLONE ACETONIDE 5 MG/G
CREAM TOPICAL 2 TIMES DAILY
COMMUNITY
Start: 2024-02-06

## 2024-03-26 SDOH — ECONOMIC STABILITY: FOOD INSECURITY: WITHIN THE PAST 12 MONTHS, THE FOOD YOU BOUGHT JUST DIDN'T LAST AND YOU DIDN'T HAVE MONEY TO GET MORE.: NEVER TRUE

## 2024-03-26 SDOH — ECONOMIC STABILITY: FOOD INSECURITY: WITHIN THE PAST 12 MONTHS, YOU WORRIED THAT YOUR FOOD WOULD RUN OUT BEFORE YOU GOT MONEY TO BUY MORE.: NEVER TRUE

## 2024-03-26 SDOH — ECONOMIC STABILITY: HOUSING INSECURITY
IN THE LAST 12 MONTHS, WAS THERE A TIME WHEN YOU DID NOT HAVE A STEADY PLACE TO SLEEP OR SLEPT IN A SHELTER (INCLUDING NOW)?: NO

## 2024-03-26 SDOH — ECONOMIC STABILITY: INCOME INSECURITY: HOW HARD IS IT FOR YOU TO PAY FOR THE VERY BASICS LIKE FOOD, HOUSING, MEDICAL CARE, AND HEATING?: NOT VERY HARD

## 2024-03-26 ASSESSMENT — PATIENT HEALTH QUESTIONNAIRE - PHQ9
SUM OF ALL RESPONSES TO PHQ QUESTIONS 1-9: 0
1. LITTLE INTEREST OR PLEASURE IN DOING THINGS: NOT AT ALL
SUM OF ALL RESPONSES TO PHQ9 QUESTIONS 1 & 2: 0
SUM OF ALL RESPONSES TO PHQ QUESTIONS 1-9: 0
2. FEELING DOWN, DEPRESSED OR HOPELESS: NOT AT ALL

## 2024-03-26 NOTE — PROGRESS NOTES
Chief Complaint   Patient presents with    Follow-up    Skin Problem     Arms and torso- worse on lower back         1. \"Have you been to the ER, urgent care clinic since your last visit?  Hospitalized since your last visit?\" no    2. \"Have you seen or consulted any other health care providers outside of the [facility] since your last visit?\" Has Dermatology appt on 5/8/24-Dr Chalo Gambino     3. For patients aged 45-75: Has the patient had a colonoscopy / FIT/ Cologuard? N/A      If the patient is female:    4. For patients aged 40-74: Has the patient had a mammogram within the past 2 years? NA      5. For patients aged 21-65: Has the patient had a pap smear? N/A      Health Maintenance Due   Topic Date Due    Pneumococcal 65+ years Vaccine (1 of 2 - PCV) Never done    Hepatitis A vaccine (1 of 2 - Risk 2-dose series) Never done    DTaP/Tdap/Td vaccine (1 - Tdap) Never done    Shingles vaccine (1 of 2) Never done    Hepatitis B vaccine (1 of 3 - Risk 3-dose series) Never done    Respiratory Syncytial Virus (RSV) Pregnant or age 60 yrs+ (1 - 1-dose 60+ series) Never done    Flu vaccine (1) Never done    COVID-19 Vaccine (3 - 2023-24 season) 09/01/2023    Depression Screen  12/12/2023

## 2024-03-26 NOTE — PROGRESS NOTES
Kalen Yung (:  1950) is a 73 y.o. male,Established patient, here for evaluation of the following chief complaint(s):  Follow-up and Skin Problem (Arms and torso- worse on lower back)         ASSESSMENT/PLAN:  1. Tinea versicolor  -     fluconazole (DIFLUCAN) 150 MG tablet; 2 tabs po- day 1 then take 2 more pills one week later, Disp-4 tablet, R-0Normal  Pt also has dermatology appt in 2 weeks. To keep this. Not 100% confident about the tinea diagnosis.     Return in about 2 months (around 2024).         Subjective   SUBJECTIVE/OBJECTIVE:  HPI  6 month hx rash on chest and arms. Slightly pruritic. We tried steroids and triamcinolone- helped itching some, but rash hasn't faded.   Review of Systems       Objective   Physical Exam  Cardiovascular:      Rate and Rhythm: Normal rate and regular rhythm.      Heart sounds: Normal heart sounds. No murmur heard.  Pulmonary:      Effort: Pulmonary effort is normal.      Breath sounds: Normal breath sounds.   Abdominal:      General: Abdomen is flat. Bowel sounds are normal.      Palpations: Abdomen is soft.   Musculoskeletal:      Right lower leg: No edema.      Left lower leg: No edema.   Skin:     Comments: Multiple circular and elliptical lesions on trunk and back with hyperpigmented borders and clear centers                  An electronic signature was used to authenticate this note.    --Alexis Laird MD

## 2024-05-21 ENCOUNTER — OFFICE VISIT (OUTPATIENT)
Age: 74
End: 2024-05-21
Payer: COMMERCIAL

## 2024-05-21 VITALS
OXYGEN SATURATION: 100 % | BODY MASS INDEX: 21.73 KG/M2 | DIASTOLIC BLOOD PRESSURE: 68 MMHG | RESPIRATION RATE: 18 BRPM | SYSTOLIC BLOOD PRESSURE: 153 MMHG | WEIGHT: 160.4 LBS | HEART RATE: 56 BPM | TEMPERATURE: 98 F | HEIGHT: 72 IN

## 2024-05-21 DIAGNOSIS — I10 ESSENTIAL HYPERTENSION: Primary | ICD-10-CM

## 2024-05-21 DIAGNOSIS — E78.00 HYPERCHOLESTEROLEMIA: ICD-10-CM

## 2024-05-21 DIAGNOSIS — L43.9 LICHEN PLANUS: ICD-10-CM

## 2024-05-21 LAB
ALBUMIN SERPL-MCNC: 4.1 G/DL (ref 3.5–5)
ALBUMIN/GLOB SERPL: 1.3 (ref 1.1–2.2)
ALP SERPL-CCNC: 125 U/L (ref 45–117)
ALT SERPL-CCNC: 32 U/L (ref 12–78)
ANION GAP SERPL CALC-SCNC: 3 MMOL/L (ref 5–15)
AST SERPL-CCNC: 31 U/L (ref 15–37)
BASOPHILS # BLD: 0.1 K/UL (ref 0–0.1)
BASOPHILS NFR BLD: 1 % (ref 0–1)
BILIRUB SERPL-MCNC: 0.6 MG/DL (ref 0.2–1)
BUN SERPL-MCNC: 9 MG/DL (ref 6–20)
BUN/CREAT SERPL: 7 (ref 12–20)
CALCIUM SERPL-MCNC: 9.8 MG/DL (ref 8.5–10.1)
CHLORIDE SERPL-SCNC: 108 MMOL/L (ref 97–108)
CHOLEST SERPL-MCNC: 231 MG/DL
CO2 SERPL-SCNC: 31 MMOL/L (ref 21–32)
CREAT SERPL-MCNC: 1.27 MG/DL (ref 0.7–1.3)
DIFFERENTIAL METHOD BLD: ABNORMAL
EOSINOPHIL # BLD: 0.1 K/UL (ref 0–0.4)
EOSINOPHIL NFR BLD: 3 % (ref 0–7)
ERYTHROCYTE [DISTWIDTH] IN BLOOD BY AUTOMATED COUNT: 13.1 % (ref 11.5–14.5)
GLOBULIN SER CALC-MCNC: 3.1 G/DL (ref 2–4)
GLUCOSE SERPL-MCNC: 94 MG/DL (ref 65–100)
HCT VFR BLD AUTO: 40.6 % (ref 36.6–50.3)
HDLC SERPL-MCNC: 78 MG/DL
HDLC SERPL: 3 (ref 0–5)
HGB BLD-MCNC: 13.9 G/DL (ref 12.1–17)
IMM GRANULOCYTES # BLD AUTO: 0 K/UL (ref 0–0.04)
IMM GRANULOCYTES NFR BLD AUTO: 0 % (ref 0–0.5)
LDLC SERPL CALC-MCNC: 140.4 MG/DL (ref 0–100)
LYMPHOCYTES # BLD: 2.2 K/UL (ref 0.8–3.5)
LYMPHOCYTES NFR BLD: 50 % (ref 12–49)
MCH RBC QN AUTO: 33.5 PG (ref 26–34)
MCHC RBC AUTO-ENTMCNC: 34.2 G/DL (ref 30–36.5)
MCV RBC AUTO: 97.8 FL (ref 80–99)
MONOCYTES # BLD: 0.6 K/UL (ref 0–1)
MONOCYTES NFR BLD: 13 % (ref 5–13)
NEUTS SEG # BLD: 1.4 K/UL (ref 1.8–8)
NEUTS SEG NFR BLD: 33 % (ref 32–75)
NRBC # BLD: 0 K/UL (ref 0–0.01)
NRBC BLD-RTO: 0 PER 100 WBC
PLATELET # BLD AUTO: 292 K/UL (ref 150–400)
PMV BLD AUTO: 10 FL (ref 8.9–12.9)
POTASSIUM SERPL-SCNC: 3.7 MMOL/L (ref 3.5–5.1)
PROT SERPL-MCNC: 7.2 G/DL (ref 6.4–8.2)
RBC # BLD AUTO: 4.15 M/UL (ref 4.1–5.7)
SODIUM SERPL-SCNC: 142 MMOL/L (ref 136–145)
TRIGL SERPL-MCNC: 63 MG/DL
VLDLC SERPL CALC-MCNC: 12.6 MG/DL
WBC # BLD AUTO: 4.3 K/UL (ref 4.1–11.1)

## 2024-05-21 PROCEDURE — 3078F DIAST BP <80 MM HG: CPT | Performed by: FAMILY MEDICINE

## 2024-05-21 PROCEDURE — 1123F ACP DISCUSS/DSCN MKR DOCD: CPT | Performed by: FAMILY MEDICINE

## 2024-05-21 PROCEDURE — 3077F SYST BP >= 140 MM HG: CPT | Performed by: FAMILY MEDICINE

## 2024-05-21 PROCEDURE — 99213 OFFICE O/P EST LOW 20 MIN: CPT | Performed by: FAMILY MEDICINE

## 2024-05-21 RX ORDER — ATORVASTATIN CALCIUM 40 MG/1
TABLET, FILM COATED ORAL
Qty: 90 TABLET | Refills: 3 | Status: SHIPPED | OUTPATIENT
Start: 2024-05-21

## 2024-05-21 RX ORDER — CLOBETASOL PROPIONATE 0.5 MG/G
CREAM TOPICAL
COMMUNITY
Start: 2024-05-15

## 2024-05-21 ASSESSMENT — PATIENT HEALTH QUESTIONNAIRE - PHQ9
SUM OF ALL RESPONSES TO PHQ QUESTIONS 1-9: 0
1. LITTLE INTEREST OR PLEASURE IN DOING THINGS: NOT AT ALL
2. FEELING DOWN, DEPRESSED OR HOPELESS: NOT AT ALL
SUM OF ALL RESPONSES TO PHQ QUESTIONS 1-9: 0
SUM OF ALL RESPONSES TO PHQ9 QUESTIONS 1 & 2: 0

## 2024-05-21 NOTE — PROGRESS NOTES
Kalen Yung (:  1950) is a 74 y.o. male,Established patient, here for evaluation of the following chief complaint(s):  Follow-up (Patient is here today for a 2 month follow up. )      Assessment & Plan   1. Essential hypertension  -     Comprehensive Metabolic Panel; Future  -     CBC with Auto Differential; Future  2. Hypercholesterolemia  -     atorvastatin (LIPITOR) 40 MG tablet; TAKE 1 TABLET BY MOUTH EVERY DAY FOR CHOLESTEROL, Disp-90 tablet, R-3Normal  -     Lipid Panel; Future  3. Lichen planus    Recheck in 6 months  No follow-ups on file.       Subjective   HPI Has been to see dermatology, and had a biopsy, was dxed with lichen planus, and is now on a cream. Needs blood pressure and cholesterol checked. Works out at [employer]- lifts weights, does treadmill, plays pickelball.     Review of Systems       Objective   Physical Exam  Cardiovascular:      Rate and Rhythm: Normal rate and regular rhythm.      Heart sounds: Normal heart sounds. No murmur heard.  Pulmonary:      Effort: Pulmonary effort is normal.      Breath sounds: Normal breath sounds.   Abdominal:      General: Abdomen is flat. Bowel sounds are normal.      Palpations: Abdomen is soft.   Musculoskeletal:      Right lower leg: No edema.      Left lower leg: No edema.   Skin:     Comments: Multiple violaceous papules with clear centers. Several lesions are fading.                 An electronic signature was used to authenticate this note.    --Alexis Laird MD

## 2024-05-21 NOTE — PROGRESS NOTES
Chief Complaint   Patient presents with    Follow-up     Patient is here today for a 2 month follow up.      1. Have you been to the ER, urgent care clinic since your last visit?  Hospitalized since your last visit?No    2. Have you seen or consulted any other health care providers outside of the [facility] since your last visit?  Include any pap smears or colon screening. No

## 2024-05-29 ENCOUNTER — TELEPHONE (OUTPATIENT)
Age: 74
End: 2024-05-29

## 2024-07-07 DIAGNOSIS — Z00.00 ENCOUNTER FOR GENERAL ADULT MEDICAL EXAMINATION WITHOUT ABNORMAL FINDINGS: ICD-10-CM

## 2024-07-08 RX ORDER — HYDROCHLOROTHIAZIDE 12.5 MG/1
CAPSULE, GELATIN COATED ORAL
Qty: 90 CAPSULE | Refills: 3 | Status: SHIPPED | OUTPATIENT
Start: 2024-07-08

## 2024-11-13 RX ORDER — AMLODIPINE BESYLATE 2.5 MG/1
2.5 TABLET ORAL DAILY
Qty: 90 TABLET | Refills: 3 | Status: SHIPPED | OUTPATIENT
Start: 2024-11-13

## 2024-11-13 NOTE — TELEPHONE ENCOUNTER
Last appointment: 5/21/24  Next appointment: Advised to follow-up 11/21/24  Previous refill encounter(s): 12/13/23    Requested Prescriptions     Pending Prescriptions Disp Refills    amLODIPine (NORVASC) 2.5 MG tablet [Pharmacy Med Name: AMLODIPINE BESYLATE 2.5 MG TAB] 90 tablet 3     Sig: TAKE 1 TABLET BY MOUTH EVERY DAY FOR BLOOD PRESSURE         For Pharmacy Admin Tracking Only    Program: Medication Refill  CPA in place:    Recommendation Provided To:   Intervention Detail: New Rx: 1, reason: Patient Preference  Intervention Accepted By:   Gap Closed?:    Time Spent (min): 5

## 2025-04-21 ENCOUNTER — OFFICE VISIT (OUTPATIENT)
Age: 75
End: 2025-04-21
Payer: MEDICARE

## 2025-04-21 VITALS
SYSTOLIC BLOOD PRESSURE: 132 MMHG | HEIGHT: 72 IN | WEIGHT: 156 LBS | TEMPERATURE: 97.7 F | BODY MASS INDEX: 21.13 KG/M2 | DIASTOLIC BLOOD PRESSURE: 60 MMHG | RESPIRATION RATE: 16 BRPM | HEART RATE: 62 BPM | OXYGEN SATURATION: 99 %

## 2025-04-21 DIAGNOSIS — K59.09 OTHER CONSTIPATION: ICD-10-CM

## 2025-04-21 DIAGNOSIS — E78.00 HYPERCHOLESTEROLEMIA: ICD-10-CM

## 2025-04-21 DIAGNOSIS — Z00.00 ENCOUNTER FOR MEDICARE ANNUAL WELLNESS EXAM: Primary | ICD-10-CM

## 2025-04-21 DIAGNOSIS — B18.1 CHRONIC VIRAL HEPATITIS B WITHOUT DELTA AGENT AND WITHOUT COMA (HCC): ICD-10-CM

## 2025-04-21 DIAGNOSIS — I10 ESSENTIAL HYPERTENSION: ICD-10-CM

## 2025-04-21 PROBLEM — T40.2X1A OPIOID OVERDOSE, ACCIDENTAL OR UNINTENTIONAL, INITIAL ENCOUNTER (HCC): Status: RESOLVED | Noted: 2022-12-12 | Resolved: 2025-04-21

## 2025-04-21 PROCEDURE — G8427 DOCREV CUR MEDS BY ELIG CLIN: HCPCS | Performed by: FAMILY MEDICINE

## 2025-04-21 PROCEDURE — 1123F ACP DISCUSS/DSCN MKR DOCD: CPT | Performed by: FAMILY MEDICINE

## 2025-04-21 PROCEDURE — 3075F SYST BP GE 130 - 139MM HG: CPT | Performed by: FAMILY MEDICINE

## 2025-04-21 PROCEDURE — G0439 PPPS, SUBSEQ VISIT: HCPCS | Performed by: FAMILY MEDICINE

## 2025-04-21 PROCEDURE — 3017F COLORECTAL CA SCREEN DOC REV: CPT | Performed by: FAMILY MEDICINE

## 2025-04-21 PROCEDURE — 99213 OFFICE O/P EST LOW 20 MIN: CPT | Performed by: FAMILY MEDICINE

## 2025-04-21 PROCEDURE — G8420 CALC BMI NORM PARAMETERS: HCPCS | Performed by: FAMILY MEDICINE

## 2025-04-21 PROCEDURE — 1159F MED LIST DOCD IN RCRD: CPT | Performed by: FAMILY MEDICINE

## 2025-04-21 PROCEDURE — 4004F PT TOBACCO SCREEN RCVD TLK: CPT | Performed by: FAMILY MEDICINE

## 2025-04-21 PROCEDURE — 1126F AMNT PAIN NOTED NONE PRSNT: CPT | Performed by: FAMILY MEDICINE

## 2025-04-21 PROCEDURE — 3078F DIAST BP <80 MM HG: CPT | Performed by: FAMILY MEDICINE

## 2025-04-21 RX ORDER — POLYETHYLENE GLYCOL 3350 17 G/17G
17 POWDER, FOR SOLUTION ORAL DAILY
Qty: 1530 G | Refills: 1 | Status: SHIPPED | OUTPATIENT
Start: 2025-04-21 | End: 2025-10-18

## 2025-04-21 SDOH — ECONOMIC STABILITY: FOOD INSECURITY: WITHIN THE PAST 12 MONTHS, YOU WORRIED THAT YOUR FOOD WOULD RUN OUT BEFORE YOU GOT MONEY TO BUY MORE.: NEVER TRUE

## 2025-04-21 SDOH — ECONOMIC STABILITY: FOOD INSECURITY: WITHIN THE PAST 12 MONTHS, THE FOOD YOU BOUGHT JUST DIDN'T LAST AND YOU DIDN'T HAVE MONEY TO GET MORE.: NEVER TRUE

## 2025-04-21 ASSESSMENT — PATIENT HEALTH QUESTIONNAIRE - PHQ9
SUM OF ALL RESPONSES TO PHQ QUESTIONS 1-9: 0
2. FEELING DOWN, DEPRESSED OR HOPELESS: NOT AT ALL
SUM OF ALL RESPONSES TO PHQ QUESTIONS 1-9: 0
SUM OF ALL RESPONSES TO PHQ QUESTIONS 1-9: 0
1. LITTLE INTEREST OR PLEASURE IN DOING THINGS: NOT AT ALL
SUM OF ALL RESPONSES TO PHQ QUESTIONS 1-9: 0

## 2025-04-21 ASSESSMENT — LIFESTYLE VARIABLES
HOW OFTEN DO YOU HAVE A DRINK CONTAINING ALCOHOL: MONTHLY OR LESS
HOW MANY STANDARD DRINKS CONTAINING ALCOHOL DO YOU HAVE ON A TYPICAL DAY: 1 OR 2

## 2025-04-21 NOTE — ASSESSMENT & PLAN NOTE
Orders:    CBC with Auto Differential; Future    Comprehensive Metabolic Panel; Future    Urinalysis; Future

## 2025-04-21 NOTE — PROGRESS NOTES
Kalen Yung (:  1950) is a 75 y.o. male,Established patient, here for evaluation of the following chief complaint(s):  Medicare AWV and Pain (Lower abdominal - )         Assessment & Plan  Essential hypertension       Orders:    CBC with Auto Differential; Future    Comprehensive Metabolic Panel; Future    Urinalysis; Future    Hypercholesterolemia       Orders:    Lipid Panel; Future    Other constipation            Chronic viral hepatitis B without delta agent and without coma (HCC)            Encounter for Medicare annual wellness exam              No follow-ups on file.       Subjective   Pain     Has been having a discomfort in [place] for a few months. Has also had some problems with constipation. Abdominal pains get better if bowels move. No blood in stools. Discomfort isnt related to eating  No dark black bowel movements. No weight loss. Had a colonoscopy 2022- one 6 mm hyperplastic rectal polyp, otherwise normal. (Moises Renteria)  Also needs medicare wellness exam. Seeing Dr. De Leon (gi) Has had 4 covid shots, declines other shots. Would want wife Chiqui ?Dilshad to be his mPOA if he becajme incapacitataed   Review of Systems   HENT:  Negative for hearing loss.    Neurological:         No falls, canes. Independent in all adls. Memory good.    Psychiatric/Behavioral:          Not depressed. Drinks 6 beers a week.           Objective   Physical Exam  Cardiovascular:      Rate and Rhythm: Normal rate and regular rhythm.      Heart sounds: Normal heart sounds. No murmur heard.  Pulmonary:      Effort: Pulmonary effort is normal.      Breath sounds: Normal breath sounds.   Abdominal:      General: Abdomen is flat. Bowel sounds are normal.      Palpations: Abdomen is soft.      Comments: Minimal tenderness right lateral upper quadrant.    Musculoskeletal:      Right lower leg: No edema.      Left lower leg: No edema.                  An electronic signature was used to authenticate this

## 2025-04-21 NOTE — PROGRESS NOTES
Chief Complaint   Patient presents with    Medicare AWV    Pain     Lower abdominal -        \"Have you been to the ER, urgent care clinic since your last visit?  Hospitalized since your last visit?\"    NO    “Have you seen or consulted any other health care providers outside of [facility] since your last visit?”    NO            Click Here for Release of Records Request       Vitals:    25 1534   BP: 132/60   Pulse: 62   Resp: 16   Temp: 97.7 °F (36.5 °C)   SpO2: 99%      Health Maintenance Due   Topic Date Due    Hepatitis A vaccine (1 of 2 - Risk 2-dose series) Never done    DTaP/Tdap/Td vaccine (1 - Tdap) Never done    Pneumococcal 50+ years Vaccine (1 of 2 - PCV) Never done    Shingles vaccine (1 of 2) Never done    Hepatitis B vaccine (1 of 3 - Risk 3-dose series) Never done    Respiratory Syncytial Virus (RSV) Pregnant or age 60 yrs+ (1 - Risk 60-74 years 1-dose series) Never done    COVID-19 Vaccine (3 - 2024-25 season) 2024    Annual Wellness Visit (Medicare Advantage)  2025    Lipids  2025    Depression Screen  2025        The patient, Kalen Yung, identity was verified by name and .

## 2025-04-22 LAB
AMORPH CRY URNS QL MICRO: ABNORMAL
APPEARANCE UR: ABNORMAL
BACTERIA URNS QL MICRO: NEGATIVE /HPF
BILIRUB UR QL: NEGATIVE
COLOR UR: ABNORMAL
EPITH CASTS URNS QL MICRO: ABNORMAL /LPF
GLUCOSE UR STRIP.AUTO-MCNC: NEGATIVE MG/DL
HGB UR QL STRIP: NEGATIVE
KETONES UR QL STRIP.AUTO: ABNORMAL MG/DL
LEUKOCYTE ESTERASE UR QL STRIP.AUTO: NEGATIVE
NITRITE UR QL STRIP.AUTO: NEGATIVE
PH UR STRIP: 5.5 (ref 5–8)
PROT UR STRIP-MCNC: ABNORMAL MG/DL
RBC #/AREA URNS HPF: ABNORMAL /HPF (ref 0–5)
SP GR UR REFRACTOMETRY: 1.03 (ref 1–1.03)
UROBILINOGEN UR QL STRIP.AUTO: 1 EU/DL (ref 0.2–1)
WBC URNS QL MICRO: ABNORMAL /HPF (ref 0–4)

## 2025-04-23 LAB
ALBUMIN SERPL-MCNC: 4.2 G/DL (ref 3.5–5)
ALBUMIN/GLOB SERPL: 1.6 (ref 1.1–2.2)
ALP SERPL-CCNC: 94 U/L (ref 45–117)
ALT SERPL-CCNC: 39 U/L (ref 12–78)
ANION GAP SERPL CALC-SCNC: 7 MMOL/L (ref 2–12)
AST SERPL-CCNC: 30 U/L (ref 15–37)
BASOPHILS # BLD: 0.04 K/UL (ref 0–0.1)
BASOPHILS NFR BLD: 0.6 % (ref 0–1)
BILIRUB SERPL-MCNC: 0.6 MG/DL (ref 0.2–1)
BUN SERPL-MCNC: 16 MG/DL (ref 6–20)
BUN/CREAT SERPL: 10 (ref 12–20)
CALCIUM SERPL-MCNC: 10 MG/DL (ref 8.5–10.1)
CHLORIDE SERPL-SCNC: 107 MMOL/L (ref 97–108)
CHOLEST SERPL-MCNC: 147 MG/DL
CO2 SERPL-SCNC: 27 MMOL/L (ref 21–32)
CREAT SERPL-MCNC: 1.63 MG/DL (ref 0.7–1.3)
DIFFERENTIAL METHOD BLD: ABNORMAL
EOSINOPHIL # BLD: 0.22 K/UL (ref 0–0.4)
EOSINOPHIL NFR BLD: 3.5 % (ref 0–7)
ERYTHROCYTE [DISTWIDTH] IN BLOOD BY AUTOMATED COUNT: 12.7 % (ref 11.5–14.5)
GLOBULIN SER CALC-MCNC: 2.7 G/DL (ref 2–4)
GLUCOSE SERPL-MCNC: 105 MG/DL (ref 65–100)
HCT VFR BLD AUTO: 39 % (ref 36.6–50.3)
HDLC SERPL-MCNC: 76 MG/DL
HDLC SERPL: 1.9 (ref 0–5)
HGB BLD-MCNC: 13.5 G/DL (ref 12.1–17)
IMM GRANULOCYTES # BLD AUTO: 0.01 K/UL (ref 0–0.04)
IMM GRANULOCYTES NFR BLD AUTO: 0.2 % (ref 0–0.5)
LDLC SERPL CALC-MCNC: 53.6 MG/DL (ref 0–100)
LYMPHOCYTES # BLD: 3.59 K/UL (ref 0.8–3.5)
LYMPHOCYTES NFR BLD: 56.4 % (ref 12–49)
MCH RBC QN AUTO: 33.8 PG (ref 26–34)
MCHC RBC AUTO-ENTMCNC: 34.6 G/DL (ref 30–36.5)
MCV RBC AUTO: 97.7 FL (ref 80–99)
MONOCYTES # BLD: 0.6 K/UL (ref 0–1)
MONOCYTES NFR BLD: 9.4 % (ref 5–13)
NEUTS SEG # BLD: 1.9 K/UL (ref 1.8–8)
NEUTS SEG NFR BLD: 29.9 % (ref 32–75)
NRBC # BLD: 0 K/UL (ref 0–0.01)
NRBC BLD-RTO: 0 PER 100 WBC
PLATELET # BLD AUTO: 230 K/UL (ref 150–400)
PMV BLD AUTO: 10.3 FL (ref 8.9–12.9)
POTASSIUM SERPL-SCNC: 3.8 MMOL/L (ref 3.5–5.1)
PROT SERPL-MCNC: 6.9 G/DL (ref 6.4–8.2)
RBC # BLD AUTO: 3.99 M/UL (ref 4.1–5.7)
SODIUM SERPL-SCNC: 141 MMOL/L (ref 136–145)
TRIGL SERPL-MCNC: 87 MG/DL
VLDLC SERPL CALC-MCNC: 17.4 MG/DL
WBC # BLD AUTO: 6.4 K/UL (ref 4.1–11.1)

## 2025-05-01 ENCOUNTER — RESULTS FOLLOW-UP (OUTPATIENT)
Age: 75
End: 2025-05-01

## 2025-05-20 DIAGNOSIS — E78.00 HYPERCHOLESTEROLEMIA: ICD-10-CM

## 2025-05-20 RX ORDER — ATORVASTATIN CALCIUM 40 MG/1
TABLET, FILM COATED ORAL
Qty: 90 TABLET | Refills: 3 | Status: SHIPPED | OUTPATIENT
Start: 2025-05-20

## (undated) DEVICE — SNARE VASC L240CM LOOP W10MM SHTH DIA2.4MM RND STIFF CLD

## (undated) DEVICE — TRAP SURG QUAD PARABOLA SLOT DSGN SFTY SCRN TRAPEASE